# Patient Record
Sex: FEMALE | Race: WHITE | Employment: OTHER | URBAN - METROPOLITAN AREA
[De-identification: names, ages, dates, MRNs, and addresses within clinical notes are randomized per-mention and may not be internally consistent; named-entity substitution may affect disease eponyms.]

---

## 2017-01-07 ENCOUNTER — OFFICE VISIT (OUTPATIENT)
Dept: DERMATOLOGY CLINIC | Facility: CLINIC | Age: 62
End: 2017-01-07

## 2017-01-07 DIAGNOSIS — L29.9 PRURITIC DISORDER: ICD-10-CM

## 2017-01-07 DIAGNOSIS — L81.4 SOLAR LENTIGO: Primary | ICD-10-CM

## 2017-01-07 PROCEDURE — 99212 OFFICE O/P EST SF 10 MIN: CPT | Performed by: DERMATOLOGY

## 2017-01-07 RX ORDER — UBIDECARENONE/VIT E ACET 100MG-5
CAPSULE ORAL
COMMUNITY
End: 2021-10-05

## 2017-01-07 NOTE — PROGRESS NOTES
HPI:     Chief Complaint     Lesion        HPI     Lesion    Additional comments: LOV 3/19/16. Patient with itching patch to right shoulder/back that started 2 months ago. Raised area in October but now unknown if spot is there. Continues to be pruritic.  [facility] Family History   Problem Relation Age of Onset   • Colon Cancer Mother    • Dementia Mother    • Colon Cancer Maternal Grandmother    • Dementia Father        PHYSICAL EXAM:   Patient is alert and oriented and appears their stated age.   They are well-n

## 2017-04-25 PROBLEM — B35.3 TINEA PEDIS OF RIGHT FOOT: Status: ACTIVE | Noted: 2017-04-25

## 2017-06-08 ENCOUNTER — OFFICE VISIT (OUTPATIENT)
Dept: FAMILY MEDICINE CLINIC | Facility: CLINIC | Age: 62
End: 2017-06-08

## 2017-06-08 VITALS
DIASTOLIC BLOOD PRESSURE: 73 MMHG | TEMPERATURE: 98 F | SYSTOLIC BLOOD PRESSURE: 133 MMHG | BODY MASS INDEX: 21 KG/M2 | HEART RATE: 86 BPM | WEIGHT: 117 LBS

## 2017-06-08 DIAGNOSIS — L81.9 IRREGULAR PIGMENTATION OF SKIN: Primary | ICD-10-CM

## 2017-06-08 PROCEDURE — 99213 OFFICE O/P EST LOW 20 MIN: CPT | Performed by: FAMILY MEDICINE

## 2017-06-08 PROCEDURE — 99212 OFFICE O/P EST SF 10 MIN: CPT | Performed by: FAMILY MEDICINE

## 2017-06-08 NOTE — PROGRESS NOTES
HPI:    Patient ID: Heidi Velazquez is a 60year old female. HPI  Patient presents with:  Spot: dark spot on left arm    Review of Systems   Constitutional: Negative.     Skin:        Right arm skin mole change            Current Outpatient Prescriptions:

## 2017-08-24 ENCOUNTER — OFFICE VISIT (OUTPATIENT)
Dept: FAMILY MEDICINE CLINIC | Facility: CLINIC | Age: 62
End: 2017-08-24

## 2017-08-24 VITALS
HEART RATE: 89 BPM | HEIGHT: 62 IN | SYSTOLIC BLOOD PRESSURE: 121 MMHG | WEIGHT: 115.63 LBS | BODY MASS INDEX: 21.28 KG/M2 | RESPIRATION RATE: 12 BRPM | DIASTOLIC BLOOD PRESSURE: 77 MMHG | TEMPERATURE: 99 F

## 2017-08-24 DIAGNOSIS — I65.23 ATHEROSCLEROSIS OF BOTH CAROTID ARTERIES: ICD-10-CM

## 2017-08-24 DIAGNOSIS — Z00.00 ADULT GENERAL MEDICAL EXAM: Primary | ICD-10-CM

## 2017-08-24 DIAGNOSIS — Z23 NEED FOR VACCINATION: ICD-10-CM

## 2017-08-24 PROCEDURE — 90471 IMMUNIZATION ADMIN: CPT | Performed by: FAMILY MEDICINE

## 2017-08-24 PROCEDURE — 90736 HZV VACCINE LIVE SUBQ: CPT | Performed by: FAMILY MEDICINE

## 2017-08-24 PROCEDURE — 99396 PREV VISIT EST AGE 40-64: CPT | Performed by: FAMILY MEDICINE

## 2017-08-24 PROCEDURE — 90472 IMMUNIZATION ADMIN EACH ADD: CPT | Performed by: FAMILY MEDICINE

## 2017-08-24 PROCEDURE — 90715 TDAP VACCINE 7 YRS/> IM: CPT | Performed by: FAMILY MEDICINE

## 2017-08-24 NOTE — PROGRESS NOTES
Patient ID: Harmony Mena is a 60year old female. HPI  Patient presents with:  Routine Physical    She had a life screening on March 10, 2014 and results indicated mild to moderate carotid artery blockage.   They felt that she would benefit from 5 mor Date   GLU 92 03/19/2016   BUN 12 03/19/2016   BUNCREA 18.8 03/19/2016   CREATSERUM 0.64 03/19/2016   ANIONGAP 10 03/19/2016   GFRNAA >60 03/19/2016   GFRAA >60 03/19/2016   CA 9.0 03/19/2016   OSMOCALC 289 03/19/2016   AST 43 (H) 03/19/2016   ALT 28 03/19 IRONTOT, TIBC, IRONSAT, TRANSFERRIN, TIBCP, IRONBIND, SAT, SATUR    No results found for: VINEET      Lab Results  Component Value Date   FZZU89VC 28.2 06/09/2012     No results found for: PSA, QPSA, TOTPSASCREEN      Wt Readings from Last 6 Encounters:  08/2 Smokeless tobacco: Never Used    Alcohol use Yes  0.0 oz/week     Comment: wine, 1 glass daily    Drug use: No    Sexual activity: Not on file     Other Topics Concern    Caffeine Concern No    Pt has a pacemaker No    Reaction to local anesthetic No Diagnoses and all orders for this visit:    Adult general medical exam  -     CBC WITH DIFFERENTIAL WITH PLATELET; Future  -     COMP METABOLIC PANEL (14); Future  -     LIPID PANEL;  Future  -     ASSAY, THYROID STIM HORMONE; Future    Need for vaccina

## 2017-08-26 ENCOUNTER — LAB ENCOUNTER (OUTPATIENT)
Dept: LAB | Age: 62
End: 2017-08-26
Attending: FAMILY MEDICINE
Payer: COMMERCIAL

## 2017-08-26 DIAGNOSIS — Z00.00 ADULT GENERAL MEDICAL EXAM: ICD-10-CM

## 2017-08-26 DIAGNOSIS — I65.23 ATHEROSCLEROSIS OF BOTH CAROTID ARTERIES: ICD-10-CM

## 2017-08-26 LAB
ALBUMIN SERPL BCP-MCNC: 3.9 G/DL (ref 3.5–4.8)
ALBUMIN/GLOB SERPL: 1.3 {RATIO} (ref 1–2)
ALP SERPL-CCNC: 78 U/L (ref 32–100)
ALT SERPL-CCNC: 28 U/L (ref 14–54)
ANION GAP SERPL CALC-SCNC: 10 MMOL/L (ref 0–18)
AST SERPL-CCNC: 36 U/L (ref 15–41)
BASOPHILS # BLD: 0 K/UL (ref 0–0.2)
BASOPHILS NFR BLD: 1 %
BILIRUB SERPL-MCNC: 1.6 MG/DL (ref 0.3–1.2)
BUN SERPL-MCNC: 10 MG/DL (ref 8–20)
BUN/CREAT SERPL: 9.3 (ref 10–20)
CALCIUM SERPL-MCNC: 9.2 MG/DL (ref 8.5–10.5)
CHLORIDE SERPL-SCNC: 102 MMOL/L (ref 95–110)
CHOLEST SERPL-MCNC: 243 MG/DL (ref 110–200)
CO2 SERPL-SCNC: 26 MMOL/L (ref 22–32)
CREAT SERPL-MCNC: 1.07 MG/DL (ref 0.5–1.5)
CRP SERPL HS-MCNC: 3 MG/L (ref 0–7.5)
EOSINOPHIL # BLD: 0.1 K/UL (ref 0–0.7)
EOSINOPHIL NFR BLD: 3 %
ERYTHROCYTE [DISTWIDTH] IN BLOOD BY AUTOMATED COUNT: 13 % (ref 11–15)
GLOBULIN PLAS-MCNC: 2.9 G/DL (ref 2.5–3.7)
GLUCOSE SERPL-MCNC: 93 MG/DL (ref 70–99)
HCT VFR BLD AUTO: 38.7 % (ref 35–48)
HDLC SERPL-MCNC: 87 MG/DL
HGB BLD-MCNC: 13.5 G/DL (ref 12–16)
LDLC SERPL CALC-MCNC: 145 MG/DL (ref 0–99)
LYMPHOCYTES # BLD: 0.9 K/UL (ref 1–4)
LYMPHOCYTES NFR BLD: 19 %
MCH RBC QN AUTO: 33.7 PG (ref 27–32)
MCHC RBC AUTO-ENTMCNC: 34.9 G/DL (ref 32–37)
MCV RBC AUTO: 96.6 FL (ref 80–100)
MONOCYTES # BLD: 0.4 K/UL (ref 0–1)
MONOCYTES NFR BLD: 9 %
NEUTROPHILS # BLD AUTO: 3.4 K/UL (ref 1.8–7.7)
NEUTROPHILS NFR BLD: 69 %
NONHDLC SERPL-MCNC: 156 MG/DL
OSMOLALITY UR CALC.SUM OF ELEC: 285 MOSM/KG (ref 275–295)
PLATELET # BLD AUTO: 233 K/UL (ref 140–400)
PMV BLD AUTO: 7.2 FL (ref 7.4–10.3)
POTASSIUM SERPL-SCNC: 4.3 MMOL/L (ref 3.3–5.1)
PROT SERPL-MCNC: 6.8 G/DL (ref 5.9–8.4)
RBC # BLD AUTO: 4 M/UL (ref 3.7–5.4)
SODIUM SERPL-SCNC: 138 MMOL/L (ref 136–144)
TRIGL SERPL-MCNC: 56 MG/DL (ref 1–149)
TSH SERPL-ACNC: 1.67 UIU/ML (ref 0.45–5.33)
WBC # BLD AUTO: 4.9 K/UL (ref 4–11)

## 2017-08-26 PROCEDURE — 85025 COMPLETE CBC W/AUTO DIFF WBC: CPT

## 2017-08-26 PROCEDURE — 84443 ASSAY THYROID STIM HORMONE: CPT

## 2017-08-26 PROCEDURE — 80061 LIPID PANEL: CPT

## 2017-08-26 PROCEDURE — 86141 C-REACTIVE PROTEIN HS: CPT

## 2017-08-26 PROCEDURE — 36415 COLL VENOUS BLD VENIPUNCTURE: CPT

## 2017-08-26 PROCEDURE — 80053 COMPREHEN METABOLIC PANEL: CPT

## 2017-08-31 ENCOUNTER — TELEPHONE (OUTPATIENT)
Dept: OTHER | Age: 62
End: 2017-08-31

## 2017-08-31 RX ORDER — ATORVASTATIN CALCIUM 20 MG/1
20 TABLET, FILM COATED ORAL NIGHTLY
Qty: 30 TABLET | Refills: 3 | Status: SHIPPED | OUTPATIENT
Start: 2017-08-31 | End: 2017-09-23

## 2017-08-31 RX ORDER — ATORVASTATIN CALCIUM 20 MG/1
20 TABLET, FILM COATED ORAL NIGHTLY
Qty: 30 TABLET | Refills: 2 | Status: CANCELLED | OUTPATIENT
Start: 2017-08-31

## 2017-08-31 NOTE — TELEPHONE ENCOUNTER
Lets go ahead and start her on Lipitor 20 mg and then 2 months go ahead and order an AST, ALT and a lipid panel for her with diagnosis of hyperlipidemia and see how she does.   Have her take it with or without food either at dinner or bedtime as it works be

## 2017-08-31 NOTE — TELEPHONE ENCOUNTER
Advised patient of Dr. Erma Valle note. Patient verbalized understanding and stated that she was doing a diet before that had her eating red meat for 2 years. Patient has cut out the redmeat and has been exercising.  Patient would prefer to still stick with d

## 2017-08-31 NOTE — TELEPHONE ENCOUNTER
Notes Recorded by Ismael Blevins DO on 8/29/2017 at 3:49 PM CDT  Your thyroid test is normal.  Your C-reactive protein for inflammation of the heart is at 3 and the #3 or above indicates elevated coronary artery disease risk due to high cholesterol. Alex Childs

## 2017-09-23 ENCOUNTER — OFFICE VISIT (OUTPATIENT)
Dept: FAMILY MEDICINE CLINIC | Facility: CLINIC | Age: 62
End: 2017-09-23

## 2017-09-23 VITALS
SYSTOLIC BLOOD PRESSURE: 110 MMHG | BODY MASS INDEX: 21.38 KG/M2 | WEIGHT: 116.19 LBS | HEART RATE: 85 BPM | DIASTOLIC BLOOD PRESSURE: 69 MMHG | RESPIRATION RATE: 12 BRPM | TEMPERATURE: 98 F | HEIGHT: 62 IN

## 2017-09-23 DIAGNOSIS — I65.23 ATHEROSCLEROSIS OF BOTH CAROTID ARTERIES: ICD-10-CM

## 2017-09-23 DIAGNOSIS — E78.2 MIXED HYPERLIPIDEMIA: Primary | ICD-10-CM

## 2017-09-23 PROCEDURE — 99212 OFFICE O/P EST SF 10 MIN: CPT | Performed by: FAMILY MEDICINE

## 2017-09-23 PROCEDURE — 99214 OFFICE O/P EST MOD 30 MIN: CPT | Performed by: FAMILY MEDICINE

## 2017-09-23 NOTE — PROGRESS NOTES
Patient ID: Ian Christianson is a 60year old female. HPI  Patient presents with:  Test Results: labs done on 8-26-17       She is here with her . She does not eat too much meat. She has to exercise classes today.   She did have some mild atheros HDL Cholesterol      mg/dL 87   CHOLESTEROL, TOTAL      110 - 200 mg/dL 243 (H)   Triglycerides      1 - 149 mg/dL 56   NON HDL CHOL      <130 mg/dL 156 (H)   LDL Cholesterol Calc      0 - 99 mg/dL 145 (H)   TSH      0.45 - 5.33 uIU/mL 1.67   HSCRP person, place, and time. Skin: Skin is warm and dry. Psychiatric: Patient has a normal mood and affect. Nursing note and vitals reviewed.          ASSESSMENT/PLAN:     Diagnoses and all orders for this visit:    Mixed hyperlipidemia  I put her numbers

## 2017-09-23 NOTE — PATIENT INSTRUCTIONS
Call for a coronary calcium score at the hospital.  Call [phone] to schedule this. It is either $75 or $100 cash. You should get the results the same day and please asked them to send me the results.

## 2017-10-21 ENCOUNTER — HOSPITAL ENCOUNTER (OUTPATIENT)
Dept: CT IMAGING | Facility: HOSPITAL | Age: 62
Discharge: HOME | End: 2017-10-21
Attending: FAMILY MEDICINE

## 2017-10-21 VITALS — DIASTOLIC BLOOD PRESSURE: 76 MMHG | SYSTOLIC BLOOD PRESSURE: 146 MMHG | HEART RATE: 76 BPM

## 2017-10-21 DIAGNOSIS — Z13.9 ENCOUNTER FOR SCREENING, UNSPECIFIED: ICD-10-CM

## 2017-10-25 ENCOUNTER — TELEPHONE (OUTPATIENT)
Dept: OTHER | Age: 62
End: 2017-10-25

## 2017-10-25 NOTE — TELEPHONE ENCOUNTER
LMTCB. Transfer to [id number].      ----- Message from Jerica Phillips DO sent at 10/25/2017  1:48 PM CDT -----  Your coronary calcium score was wonderful you have a score of 0 which puts you at a very low risk of significant coronary artery narrowing.   I would

## 2018-08-06 ENCOUNTER — NURSE TRIAGE (OUTPATIENT)
Dept: OTHER | Age: 63
End: 2018-08-06

## 2018-08-06 NOTE — TELEPHONE ENCOUNTER
Patient called and informed. An appointment scheduled for 8/7/18. Patient stated she has not felt the pains since speaking to me this morning.

## 2018-08-06 NOTE — TELEPHONE ENCOUNTER
I called the patient and she stated she feels better now and would like to come in tomorrow if possible. Can we add her on tomorrow?

## 2018-08-06 NOTE — TELEPHONE ENCOUNTER
Action Requested: Summary for Provider     []  Critical Lab, Recommendations Needed  [] Need Additional Advice  []   FYI    []   Need Orders  [] Need Medications Sent to Pharmacy  []  Other     SUMMARY:   Please advise if we can add her on to the schedule.

## 2018-08-07 ENCOUNTER — APPOINTMENT (OUTPATIENT)
Dept: LAB | Age: 63
End: 2018-08-07
Attending: FAMILY MEDICINE
Payer: COMMERCIAL

## 2018-08-07 ENCOUNTER — OFFICE VISIT (OUTPATIENT)
Dept: FAMILY MEDICINE CLINIC | Facility: CLINIC | Age: 63
End: 2018-08-07
Payer: COMMERCIAL

## 2018-08-07 VITALS
BODY MASS INDEX: 21.57 KG/M2 | RESPIRATION RATE: 14 BRPM | SYSTOLIC BLOOD PRESSURE: 139 MMHG | TEMPERATURE: 98 F | HEART RATE: 96 BPM | HEIGHT: 62 IN | DIASTOLIC BLOOD PRESSURE: 85 MMHG | WEIGHT: 117.19 LBS

## 2018-08-07 DIAGNOSIS — R07.89 LEFT-SIDED CHEST WALL PAIN: Primary | ICD-10-CM

## 2018-08-07 DIAGNOSIS — R07.89 LEFT-SIDED CHEST WALL PAIN: ICD-10-CM

## 2018-08-07 DIAGNOSIS — K14.6 BURNING TONGUE SYNDROME: ICD-10-CM

## 2018-08-07 DIAGNOSIS — R94.31 ABNORMAL EKG: ICD-10-CM

## 2018-08-07 DIAGNOSIS — F41.9 ANXIETY: ICD-10-CM

## 2018-08-07 LAB
CRP SERPL-MCNC: 0.5 MG/DL (ref 0–0.9)
ERYTHROCYTE [SEDIMENTATION RATE] IN BLOOD: 8 MM/HR (ref 0–30)
RHEUMATOID FACT SER QL: <5 IU/ML
VIT B12 SERPL-MCNC: 431 PG/ML (ref 181–914)

## 2018-08-07 PROCEDURE — 93010 ELECTROCARDIOGRAM REPORT: CPT | Performed by: FAMILY MEDICINE

## 2018-08-07 PROCEDURE — 99214 OFFICE O/P EST MOD 30 MIN: CPT | Performed by: FAMILY MEDICINE

## 2018-08-07 PROCEDURE — 93005 ELECTROCARDIOGRAM TRACING: CPT

## 2018-08-07 PROCEDURE — 86038 ANTINUCLEAR ANTIBODIES: CPT

## 2018-08-07 PROCEDURE — 85652 RBC SED RATE AUTOMATED: CPT

## 2018-08-07 PROCEDURE — 82607 VITAMIN B-12: CPT

## 2018-08-07 PROCEDURE — 86140 C-REACTIVE PROTEIN: CPT

## 2018-08-07 PROCEDURE — 99212 OFFICE O/P EST SF 10 MIN: CPT | Performed by: FAMILY MEDICINE

## 2018-08-07 PROCEDURE — 83013 H PYLORI (C-13) BREATH: CPT

## 2018-08-07 PROCEDURE — 36415 COLL VENOUS BLD VENIPUNCTURE: CPT

## 2018-08-07 PROCEDURE — 86431 RHEUMATOID FACTOR QUANT: CPT

## 2018-08-07 NOTE — PROGRESS NOTES
Patient ID: Heidi Velazquez is a 58year old female. HPI  Patient presents with:   Anxiety: chest discomfort     Action Requested: Summary for Provider     []  Critical Lab, Recommendations Needed  [] Need Additional Advice  []   FYI    []   Need Orders see a counselor or psychiatrist.  She states that the pain on the left side is fleeting in nature and sometimes a pinching pain. It does not really happen when she is exercising. She had a coronary calcium score in October 2017 with a score of 0.   She well-nourished. No distress. HENT:   Head: Normocephalic and atraumatic. Neck: Normal range of motion. Neck supple. No thyromegaly present. Oropharynx is clear. Tongue has no glossitis, redness, ulcerations.   Lymphadenopathy:     Has  no cervical ad

## 2018-08-08 ENCOUNTER — PATIENT MESSAGE (OUTPATIENT)
Dept: FAMILY MEDICINE CLINIC | Facility: CLINIC | Age: 63
End: 2018-08-08

## 2018-08-08 NOTE — TELEPHONE ENCOUNTER
From: Chela Herrera  To: Francesca Dior DO  Sent: 8/8/2018 4:49 AM CDT  Subject: Test Results Question    I would like to schedule the stress test for this Saturday, can you set that up?     Jose Briceno

## 2018-08-09 ENCOUNTER — TELEPHONE (OUTPATIENT)
Dept: FAMILY MEDICINE CLINIC | Facility: CLINIC | Age: 63
End: 2018-08-09

## 2018-08-09 LAB
H. PYLORI BREATH TEST: NEGATIVE
NUCLEAR IGG TITR SER IF: NEGATIVE {TITER}

## 2018-08-09 NOTE — TELEPHONE ENCOUNTER
Patient was left a message to call back. Transfer to [id number]  RN, please just make her f/u appt with Dr. Tracy Riddle on Tuesday SDS slot or RN slot. It appears she viewed her results via ComCam. I see a ComCam email sent to patient.  I do see Stress test sched

## 2018-08-09 NOTE — TELEPHONE ENCOUNTER
Per pt she states she got her lab results from 8/7 but is going in on Monday to have another test. She called upset because she's demanding an apt on 8/14 before she leaves on vacation. As of now only SDS slots are available.   She is also upset because she

## 2018-08-09 NOTE — TELEPHONE ENCOUNTER
----- Message from Tamara Perry DO sent at 8/7/2018  7:12 PM CDT -----  Your EKG shows some mild abnormalities. It states that you had a heart attack that is old but many times these are false findings.   We need to do a stress test.  I will go ahead and

## 2018-08-10 NOTE — TELEPHONE ENCOUNTER
Pt returned the call. She speaks rapidly and sounds anxious. She would like to discuss results of ekg and labs. Saw the results on MyChart but does not understand them. Pt was given reassurance, advised that stress test is the f/u to the abnormal EKG.  She

## 2018-08-10 NOTE — TELEPHONE ENCOUNTER
No need to see me. Let us just get the stress test and go from there. Thank you for reassuring her and calming her down.

## 2018-08-13 ENCOUNTER — HOSPITAL ENCOUNTER (OUTPATIENT)
Dept: CV DIAGNOSTICS | Facility: HOSPITAL | Age: 63
Discharge: HOME | End: 2018-08-13
Attending: FAMILY MEDICINE
Payer: COMMERCIAL

## 2018-08-13 DIAGNOSIS — R07.89 LEFT-SIDED CHEST WALL PAIN: ICD-10-CM

## 2018-08-13 DIAGNOSIS — R94.31 ABNORMAL EKG: ICD-10-CM

## 2018-08-13 DIAGNOSIS — F41.9 ANXIETY: ICD-10-CM

## 2018-08-13 PROCEDURE — 93018 CV STRESS TEST I&R ONLY: CPT | Performed by: FAMILY MEDICINE

## 2018-08-13 PROCEDURE — 93016 CV STRESS TEST SUPVJ ONLY: CPT | Performed by: FAMILY MEDICINE

## 2018-08-13 PROCEDURE — 93017 CV STRESS TEST TRACING ONLY: CPT | Performed by: FAMILY MEDICINE

## 2018-11-18 ENCOUNTER — IMAGING SERVICES (OUTPATIENT)
Dept: OTHER | Age: 63
End: 2018-11-18

## 2018-11-18 ENCOUNTER — HOSPITAL (OUTPATIENT)
Dept: OTHER | Age: 63
End: 2018-11-18
Attending: EMERGENCY MEDICINE

## 2018-12-07 ENCOUNTER — HOSPITAL ENCOUNTER (OUTPATIENT)
Dept: GENERAL RADIOLOGY | Age: 63
Discharge: HOME | End: 2018-12-07
Attending: FAMILY MEDICINE
Payer: COMMERCIAL

## 2018-12-07 ENCOUNTER — OFFICE VISIT (OUTPATIENT)
Dept: FAMILY MEDICINE CLINIC | Facility: CLINIC | Age: 63
End: 2018-12-07
Payer: COMMERCIAL

## 2018-12-07 ENCOUNTER — NURSE TRIAGE (OUTPATIENT)
Dept: OTHER | Age: 63
End: 2018-12-07

## 2018-12-07 VITALS
BODY MASS INDEX: 21.38 KG/M2 | WEIGHT: 116.19 LBS | SYSTOLIC BLOOD PRESSURE: 153 MMHG | HEART RATE: 109 BPM | RESPIRATION RATE: 16 BRPM | HEIGHT: 62 IN | TEMPERATURE: 99 F | DIASTOLIC BLOOD PRESSURE: 84 MMHG

## 2018-12-07 DIAGNOSIS — S62.502D CLOSED NONDISPLACED FRACTURE OF PHALANX OF LEFT THUMB WITH ROUTINE HEALING, UNSPECIFIED PHALANX, SUBSEQUENT ENCOUNTER: ICD-10-CM

## 2018-12-07 DIAGNOSIS — S02.2XXA CLOSED FRACTURE OF NASAL BONE, INITIAL ENCOUNTER: ICD-10-CM

## 2018-12-07 DIAGNOSIS — S00.83XA TRAUMATIC ECCHYMOSIS OF FACE, INITIAL ENCOUNTER: ICD-10-CM

## 2018-12-07 DIAGNOSIS — S00.83XA CONTUSION OF FACE, INITIAL ENCOUNTER: ICD-10-CM

## 2018-12-07 DIAGNOSIS — S00.83XA CONTUSION OF FACE, INITIAL ENCOUNTER: Primary | ICD-10-CM

## 2018-12-07 DIAGNOSIS — J34.89 NOSE PAIN: ICD-10-CM

## 2018-12-07 DIAGNOSIS — R55 SYNCOPE, UNSPECIFIED SYNCOPE TYPE: ICD-10-CM

## 2018-12-07 PROCEDURE — 70160 X-RAY EXAM OF NASAL BONES: CPT | Performed by: FAMILY MEDICINE

## 2018-12-07 PROCEDURE — 99212 OFFICE O/P EST SF 10 MIN: CPT | Performed by: FAMILY MEDICINE

## 2018-12-07 PROCEDURE — 99215 OFFICE O/P EST HI 40 MIN: CPT | Performed by: FAMILY MEDICINE

## 2018-12-07 NOTE — PROGRESS NOTES
Patient ID: Clayton Gonzales is a 58year old female.     HPI  Patient presents with:  Fall (musculoskeletal, neurologic)    Joselyn Hidalgo RN   Registered Nurse      Telephone Encounter   Signed   Encounter Date:  12/7/2018               Signed She fractured her left thumb and left index finger and this happened about 3 weeks ago. She is in a splint at this time and sees [facility]. She has an appointment to see them after she sees me today.   She is been only be able to use her right 04/25/17 : 21.58 kg/m²      BP Readings from Last 6 Encounters:  12/07/18 : 153/84  08/07/18 : 139/85  10/21/17 : 146/76  09/23/17 : 110/69  08/24/17 : 121/77  06/08/17 : 133/73        Review of Systems   Constitutional: Negative for diaphoresis.    HENT: P Eyes: Conjunctivae and EOM are normal. Pupils are equal, round, and reactive to light. She does have bruising underneath both eyes at the maxilla but otherwise really not tender there. There is no step-off over the zygomatic arch.   The orbits do not hav I did do a referral to neurology. After we are done I went and walked over to the nurses station to complete my notes.   After she was discharged from the office from a nurse she actually instead walked all the way down to me as she had couple more questio

## 2018-12-07 NOTE — TELEPHONE ENCOUNTER
Action Requested: Summary for Provider     []  Critical Lab, Recommendations Needed  [] Need Additional Advice  []   FYI    []   Need Orders  [] Need Medications Sent to Pharmacy  []  Other     SUMMARY: appt scheduled to see PCP today.   Onset 12 hours ago

## 2018-12-11 ENCOUNTER — OFFICE VISIT (OUTPATIENT)
Dept: OTOLARYNGOLOGY | Facility: CLINIC | Age: 63
End: 2018-12-11
Payer: COMMERCIAL

## 2018-12-11 VITALS
DIASTOLIC BLOOD PRESSURE: 75 MMHG | WEIGHT: 116.19 LBS | SYSTOLIC BLOOD PRESSURE: 151 MMHG | TEMPERATURE: 98 F | BODY MASS INDEX: 21.38 KG/M2 | HEIGHT: 62 IN

## 2018-12-11 DIAGNOSIS — S02.2XXA CLOSED FRACTURE OF NASAL BONE, INITIAL ENCOUNTER: Primary | ICD-10-CM

## 2018-12-11 PROCEDURE — 99243 OFF/OP CNSLTJ NEW/EST LOW 30: CPT | Performed by: OTOLARYNGOLOGY

## 2018-12-11 PROCEDURE — 99212 OFFICE O/P EST SF 10 MIN: CPT | Performed by: OTOLARYNGOLOGY

## 2018-12-12 NOTE — PROGRESS NOTES
Harmony Mena is a 58year old female. Patient presents with:  Nose Problem: nasal injury happened on december 6,2018    HPI:   She fell and struck her nose 5 days ago. She had significant bruising which started the next day.   She is not really having mu - Normal. Cranial nerves - Cranial nerves II through XII grossly intact.    Neck Exam Normal Inspection - Normal. Palpation - Normal. Parotid gland - Normal. Thyroid gland - Normal.   Psychiatric Normal Orientation - Oriented to time, place, person & situat

## 2019-02-19 ENCOUNTER — OFFICE VISIT (OUTPATIENT)
Dept: OTOLARYNGOLOGY | Facility: CLINIC | Age: 64
End: 2019-02-19
Payer: COMMERCIAL

## 2019-02-19 VITALS
TEMPERATURE: 98 F | WEIGHT: 116.19 LBS | SYSTOLIC BLOOD PRESSURE: 120 MMHG | HEIGHT: 62 IN | BODY MASS INDEX: 21.38 KG/M2 | DIASTOLIC BLOOD PRESSURE: 60 MMHG

## 2019-02-19 DIAGNOSIS — S02.2XXA CLOSED FRACTURE OF NASAL BONE, INITIAL ENCOUNTER: Primary | ICD-10-CM

## 2019-02-19 PROCEDURE — 99213 OFFICE O/P EST LOW 20 MIN: CPT | Performed by: OTOLARYNGOLOGY

## 2019-02-19 PROCEDURE — 99212 OFFICE O/P EST SF 10 MIN: CPT | Performed by: OTOLARYNGOLOGY

## 2019-02-19 NOTE — PROGRESS NOTES
Chela Herrera is a 58year old female. Patient presents with: Follow - Up: 2 month follow up- closed fracture of nasal bone-    HPI:   She feels that there is still a dark area underneath her eyelids mainly on her right side.   She feels that she was able gland - Normal. Thyroid gland - Normal.   Psychiatric Normal Orientation - Oriented to time, place, person & situation. Appropriate mood and affect. Lymph Detail Normal Submental. Submandibular. Anterior cervical. Posterior cervical. Supraclavicular.    E

## 2019-03-07 ENCOUNTER — TELEPHONE (OUTPATIENT)
Dept: NEUROLOGY | Facility: CLINIC | Age: 64
End: 2019-03-07

## 2019-03-07 ENCOUNTER — OFFICE VISIT (OUTPATIENT)
Dept: NEUROLOGY | Facility: CLINIC | Age: 64
End: 2019-03-07
Payer: COMMERCIAL

## 2019-03-07 VITALS
BODY MASS INDEX: 21.62 KG/M2 | SYSTOLIC BLOOD PRESSURE: 116 MMHG | WEIGHT: 116 LBS | HEART RATE: 84 BPM | HEIGHT: 61.5 IN | DIASTOLIC BLOOD PRESSURE: 64 MMHG

## 2019-03-07 DIAGNOSIS — R55 SYNCOPE, UNSPECIFIED SYNCOPE TYPE: Primary | ICD-10-CM

## 2019-03-07 PROCEDURE — 99244 OFF/OP CNSLTJ NEW/EST MOD 40: CPT | Performed by: OTHER

## 2019-03-07 RX ORDER — MULTIVIT-MIN/IRON FUM/FOLIC AC 7.5 MG-4
1 TABLET ORAL DAILY
COMMUNITY
End: 2021-10-05

## 2019-03-07 NOTE — TELEPHONE ENCOUNTER
[employer] for authorization of approval for CT brain wo cpt code 57245. Approval was given with Authorization # [id number] effective 03/07/19 to 04/05/19. Will call Pt. To inform. L/m advising of approval. Can proceed with scheduling appt.

## 2019-03-07 NOTE — PROGRESS NOTES
Ms Alo Hernandez, relating infertility treatment, over 25 years ago, 2 syncopal episodes. 5 years ago had a syncopal episodes on the stairs. Last November had another syncopal episode. Had a recent fall without actual loss of consciousness.   With these episod Past Medical History:   Diagnosis Date   • Leg fracture 2007   • Low blood pressure       History reviewed. No pertinent surgical history.    Family History   Problem Relation Age of Onset   • Colon Cancer Mother    • Dementia Mother    • Colon Cancer Mat commands. Cranial Nerves: Pupils Equal, Round and reactive, Extra Ocular movements intact, face symmetric, tongue midline, V1-V3 intact bilaterally. Cranial Nerves 3-7,9-12 are normal. No nystagmus.   Motor: 5/5 strength in the upper and lower extremities

## 2019-03-16 ENCOUNTER — HOSPITAL ENCOUNTER (OUTPATIENT)
Dept: ELECTROPHYSIOLOGY | Facility: HOSPITAL | Age: 64
Discharge: HOME | End: 2019-03-16
Attending: Other
Payer: COMMERCIAL

## 2019-03-16 ENCOUNTER — HOSPITAL ENCOUNTER (OUTPATIENT)
Dept: CT IMAGING | Facility: HOSPITAL | Age: 64
Discharge: HOME | End: 2019-03-16
Attending: Other
Payer: COMMERCIAL

## 2019-03-16 DIAGNOSIS — R55 SYNCOPE, UNSPECIFIED SYNCOPE TYPE: ICD-10-CM

## 2019-03-16 PROCEDURE — 95816 EEG AWAKE AND DROWSY: CPT | Performed by: OTHER

## 2019-03-16 PROCEDURE — 70450 CT HEAD/BRAIN W/O DYE: CPT | Performed by: OTHER

## 2019-03-19 ENCOUNTER — TELEPHONE (OUTPATIENT)
Dept: NEUROLOGY | Facility: CLINIC | Age: 64
End: 2019-03-19

## 2019-03-19 NOTE — TELEPHONE ENCOUNTER
Please call the patient and tell her that the CT scan of the head is essentially normal.  Please tell her that the EEG for some reason has not been read and that I have left a message for the EEG technician to get a good.   Please have her call the office i

## 2019-03-19 NOTE — TELEPHONE ENCOUNTER
Left detailed message on VM(hippa verified) of CT results. Also, still waiting for EEG results and we will follow up with results but if she does not hear from us in a few days, to call the office.

## 2019-03-20 NOTE — PROCEDURES
EEG report    REFERRING PHYSICIAN: No att. providers found    PCP and phone number:  Bc Aguilar DO  [phone]    TECHNIQUE: 21 channels of EEG, 2 channels of EOG, and 1 channel of EKG were recorded utilizing the International 10/20 System.  The re

## 2019-03-22 NOTE — TELEPHONE ENCOUNTER
Spoke to patient and advised EEG normal per report. Also gave Ct Scan results per Dr Lisa Morales note 3/19/19. Advised would notify her of any further testing if Dr Maddy Ayala feels is necessary. LOV 3/7/19 discuss possible Holter or cardiology consult.    MCKENZIE mann

## 2019-04-02 NOTE — TELEPHONE ENCOUNTER
Please tell the patient that from a neurology standpoint there is no further testing warranted. Please have the patient follow-up with her primary care doctor to obtain cardiology consult, possible Holter monitor.   Thank

## 2019-04-02 NOTE — TELEPHONE ENCOUNTER
Left detailed message on vm (hipaa verified) of 's remarks. Also, to call the office with questions if any.

## 2019-12-16 ENCOUNTER — OFFICE VISIT (OUTPATIENT)
Dept: FAMILY MEDICINE CLINIC | Facility: CLINIC | Age: 64
End: 2019-12-16
Payer: MEDICARE

## 2019-12-16 ENCOUNTER — HOSPITAL ENCOUNTER (OUTPATIENT)
Dept: GENERAL RADIOLOGY | Age: 64
Discharge: HOME | End: 2019-12-16
Attending: FAMILY MEDICINE | Admitting: FAMILY MEDICINE
Payer: MEDICARE

## 2019-12-16 VITALS
HEART RATE: 80 BPM | SYSTOLIC BLOOD PRESSURE: 130 MMHG | HEIGHT: 61.5 IN | DIASTOLIC BLOOD PRESSURE: 62 MMHG | BODY MASS INDEX: 22.18 KG/M2 | WEIGHT: 119 LBS

## 2019-12-16 DIAGNOSIS — Z23 NEED FOR VACCINATION: ICD-10-CM

## 2019-12-16 DIAGNOSIS — M25.831 MASS OF JOINT OF RIGHT WRIST: Primary | ICD-10-CM

## 2019-12-16 DIAGNOSIS — M25.831 MASS OF JOINT OF RIGHT WRIST: ICD-10-CM

## 2019-12-16 PROCEDURE — 73110 X-RAY EXAM OF WRIST: CPT | Performed by: FAMILY MEDICINE

## 2019-12-16 PROCEDURE — 90750 HZV VACC RECOMBINANT IM: CPT | Performed by: FAMILY MEDICINE

## 2019-12-16 PROCEDURE — 90471 IMMUNIZATION ADMIN: CPT | Performed by: FAMILY MEDICINE

## 2019-12-16 PROCEDURE — G0463 HOSPITAL OUTPT CLINIC VISIT: HCPCS | Performed by: FAMILY MEDICINE

## 2019-12-16 PROCEDURE — 99213 OFFICE O/P EST LOW 20 MIN: CPT | Performed by: FAMILY MEDICINE

## 2019-12-16 NOTE — PATIENT INSTRUCTIONS
Return to clinic after February 16, 2020 for the second Shingrix injection with a nurse visit. No need to see me.   Why do not you make it a physical exam and that way we can take care of the injection at the same time we do a physical exam.  May take thaddeus

## 2019-12-16 NOTE — PROGRESS NOTES
Patient ID: Tylor Sutherland is a 71year old female. HPI  Patient presents with: Other: Bump on right wrist     The patient complains of a mass on the inside of her right wrist starting approximately 1 year ago.  This has increased in size to the point Fatty Acids (FISH OIL OR) Take by mouth. • Probiotic Product (PROBIOTIC DAILY OR) Take by mouth.        Allergies:  Cat Hair Extract          Dander                      Comment:Dog  Scallops                   PHYSICAL EXAM:   Physical Exam   [facility] Referral Type:OFFICE VISIT          Referred to Shiraz Solorzano MD          Requested Specialty:SURGERY, PLASTIC          Number of Visits Requested:3        Follow up if symptoms persist.  Take medicine (if given) as prescribed.   Appr

## 2020-01-02 ENCOUNTER — OFFICE VISIT (OUTPATIENT)
Dept: SURGERY | Facility: CLINIC | Age: 65
End: 2020-01-02
Payer: MEDICARE

## 2020-01-02 DIAGNOSIS — M67.432 GANGLION OF LEFT WRIST: Primary | ICD-10-CM

## 2020-01-02 PROCEDURE — 99203 OFFICE O/P NEW LOW 30 MIN: CPT | Performed by: PLASTIC SURGERY

## 2020-01-02 PROCEDURE — G0463 HOSPITAL OUTPT CLINIC VISIT: HCPCS | Performed by: PLASTIC SURGERY

## 2020-01-02 NOTE — H&P
Yareli Roe is a 71year old female that presents with Patient presents with:  Ganglion: R wrist ulnar  .     REFERRED BY:   Shwetha Simpson DO    Pacemaker: No  Latex Allergy: no  Coumadin: No  Plavix: No  Other anticoagulants: No  Cardiac stents: No       Spouse name: Not on file      Number of children: Not on file      Years of education: Not on file      Highest education level: Not on file    Occupational History      Not on file    Social Needs      Financial resource strain: Not on file No        Pt has a defibrillator: Not Asked        Breast feeding: Not Asked        Reaction to local anesthetic: No        Left Handed: Yes        Right Handed: No        Currently spends a great deal of time in the sun: No        Past Sunlamp Treatments

## 2020-01-14 ENCOUNTER — OFFICE VISIT (OUTPATIENT)
Dept: NEUROLOGY | Facility: CLINIC | Age: 65
End: 2020-01-14
Payer: MEDICARE

## 2020-01-14 VITALS
BODY MASS INDEX: 21.62 KG/M2 | HEART RATE: 80 BPM | WEIGHT: 116 LBS | HEIGHT: 61.5 IN | RESPIRATION RATE: 18 BRPM | DIASTOLIC BLOOD PRESSURE: 62 MMHG | SYSTOLIC BLOOD PRESSURE: 110 MMHG

## 2020-01-14 DIAGNOSIS — R29.3 POOR POSTURE: ICD-10-CM

## 2020-01-14 DIAGNOSIS — M67.431 GANGLION CYST OF BOTH WRISTS: Primary | ICD-10-CM

## 2020-01-14 DIAGNOSIS — M67.432 GANGLION CYST OF BOTH WRISTS: Primary | ICD-10-CM

## 2020-01-14 DIAGNOSIS — M25.531 RIGHT WRIST PAIN: ICD-10-CM

## 2020-01-14 PROCEDURE — 99204 OFFICE O/P NEW MOD 45 MIN: CPT | Performed by: PHYSICAL MEDICINE & REHABILITATION

## 2020-01-14 NOTE — PROGRESS NOTES
[address] PATIENT EVALUATION    Consultation as a request of Dr. Olive Mancera    Chief Complaint: Right wrist pain    HISTORY OF PRESENT ILLNESS:   Patient presents with:  Wrist Pain: new left handed pat • Multiple Vitamins-Minerals (MULTI-VITAMIN/MINERALS) Oral Tab Take 1 tablet by mouth daily. • Resveratrol 100 MG Oral Cap Take by mouth. • Omega-3 Fatty Acids (FISH OIL OR) Take by mouth.      • Probiotic Product (PROBIOTIC DAILY OR) Take by mout (52.6 kg)   BMI 21.56 kg/m²   General: No immediate distress  Head: Normocephalic/ Atraumatic  Eyes: Extra-occular movements intact. Ears: No auricular hematoma or deformities  Mouth: No lesions or ulcerations  Heart: peripheral pulses intact.  Normal cap 03/19/2016    BILT 1.6 (H) 08/26/2017    TP 6.8 08/26/2017    ALB 3.9 08/26/2017    GLOBULIN 2.9 08/26/2017    AGRATIO 1.4 03/19/2016     08/26/2017    K 4.3 08/26/2017     08/26/2017    CO2 26 08/26/2017     No results found for: PTP, PT, INR

## 2020-02-17 ENCOUNTER — APPOINTMENT (OUTPATIENT)
Dept: LAB | Age: 65
End: 2020-02-17
Attending: FAMILY MEDICINE
Payer: MEDICARE

## 2020-02-17 ENCOUNTER — OFFICE VISIT (OUTPATIENT)
Dept: FAMILY MEDICINE CLINIC | Facility: CLINIC | Age: 65
End: 2020-02-17
Payer: MEDICARE

## 2020-02-17 ENCOUNTER — LAB ENCOUNTER (OUTPATIENT)
Dept: LAB | Age: 65
End: 2020-02-17
Attending: FAMILY MEDICINE
Payer: MEDICARE

## 2020-02-17 VITALS
TEMPERATURE: 99 F | DIASTOLIC BLOOD PRESSURE: 70 MMHG | WEIGHT: 118.38 LBS | BODY MASS INDEX: 22.07 KG/M2 | HEIGHT: 61.5 IN | HEART RATE: 75 BPM | SYSTOLIC BLOOD PRESSURE: 152 MMHG

## 2020-02-17 DIAGNOSIS — I65.23 ATHEROSCLEROSIS OF BOTH CAROTID ARTERIES: ICD-10-CM

## 2020-02-17 DIAGNOSIS — L81.4 SOLAR LENTIGO: ICD-10-CM

## 2020-02-17 DIAGNOSIS — Z00.00 ENCOUNTER FOR ANNUAL HEALTH EXAMINATION: ICD-10-CM

## 2020-02-17 DIAGNOSIS — Z11.59 NEED FOR HEPATITIS C SCREENING TEST: ICD-10-CM

## 2020-02-17 DIAGNOSIS — Z11.4 SCREENING FOR HIV (HUMAN IMMUNODEFICIENCY VIRUS): ICD-10-CM

## 2020-02-17 DIAGNOSIS — E78.2 MIXED HYPERLIPIDEMIA: ICD-10-CM

## 2020-02-17 DIAGNOSIS — Z23 NEED FOR VACCINATION: ICD-10-CM

## 2020-02-17 DIAGNOSIS — M85.80 OSTEOPENIA, UNSPECIFIED LOCATION: ICD-10-CM

## 2020-02-17 DIAGNOSIS — L71.9 ROSACEA: ICD-10-CM

## 2020-02-17 DIAGNOSIS — Z00.00 ADULT GENERAL MEDICAL EXAM: Primary | ICD-10-CM

## 2020-02-17 LAB
ALBUMIN SERPL-MCNC: 4 G/DL (ref 3.4–5)
ALBUMIN/GLOB SERPL: 1.1 {RATIO} (ref 1–2)
ALP LIVER SERPL-CCNC: 90 U/L (ref 50–130)
ALT SERPL-CCNC: 25 U/L (ref 13–56)
ANION GAP SERPL CALC-SCNC: 3 MMOL/L (ref 0–18)
AST SERPL-CCNC: 33 U/L (ref 15–37)
BASOPHILS # BLD AUTO: 0.06 X10(3) UL (ref 0–0.2)
BASOPHILS NFR BLD AUTO: 1.6 %
BILIRUB SERPL-MCNC: 1.1 MG/DL (ref 0.1–2)
BUN BLD-MCNC: 14 MG/DL (ref 7–18)
BUN/CREAT SERPL: 19.4 (ref 10–20)
CALCIUM BLD-MCNC: 9 MG/DL (ref 8.5–10.1)
CHLORIDE SERPL-SCNC: 108 MMOL/L (ref 98–112)
CHOLEST SMN-MCNC: 282 MG/DL (ref ?–200)
CO2 SERPL-SCNC: 30 MMOL/L (ref 21–32)
CREAT BLD-MCNC: 0.72 MG/DL (ref 0.55–1.02)
DEPRECATED RDW RBC AUTO: 44 FL (ref 35.1–46.3)
EOSINOPHIL # BLD AUTO: 0.07 X10(3) UL (ref 0–0.7)
EOSINOPHIL NFR BLD AUTO: 1.9 %
ERYTHROCYTE [DISTWIDTH] IN BLOOD BY AUTOMATED COUNT: 12.2 % (ref 11–15)
GLOBULIN PLAS-MCNC: 3.7 G/DL (ref 2.8–4.4)
GLUCOSE BLD-MCNC: 83 MG/DL (ref 70–99)
HCT VFR BLD AUTO: 41.4 % (ref 35–48)
HCV AB SERPL QL IA: NONREACTIVE
HDLC SERPL-MCNC: 89 MG/DL (ref 40–59)
HGB BLD-MCNC: 14.2 G/DL (ref 12–16)
IMM GRANULOCYTES # BLD AUTO: 0 X10(3) UL (ref 0–1)
IMM GRANULOCYTES NFR BLD: 0 %
LDLC SERPL CALC-MCNC: 172 MG/DL (ref ?–100)
LYMPHOCYTES # BLD AUTO: 1.27 X10(3) UL (ref 1–4)
LYMPHOCYTES NFR BLD AUTO: 34.1 %
M PROTEIN MFR SERPL ELPH: 7.7 G/DL (ref 6.4–8.2)
MCH RBC QN AUTO: 33.6 PG (ref 26–34)
MCHC RBC AUTO-ENTMCNC: 34.3 G/DL (ref 31–37)
MCV RBC AUTO: 97.9 FL (ref 80–100)
MONOCYTES # BLD AUTO: 0.45 X10(3) UL (ref 0.1–1)
MONOCYTES NFR BLD AUTO: 12.1 %
NEUTROPHILS # BLD AUTO: 1.87 X10 (3) UL (ref 1.5–7.7)
NEUTROPHILS # BLD AUTO: 1.87 X10(3) UL (ref 1.5–7.7)
NEUTROPHILS NFR BLD AUTO: 50.3 %
NONHDLC SERPL-MCNC: 193 MG/DL (ref ?–130)
OSMOLALITY SERPL CALC.SUM OF ELEC: 292 MOSM/KG (ref 275–295)
PATIENT FASTING Y/N/NP: YES
PATIENT FASTING Y/N/NP: YES
PLATELET # BLD AUTO: 242 10(3)UL (ref 150–450)
POTASSIUM SERPL-SCNC: 4.2 MMOL/L (ref 3.5–5.1)
RBC # BLD AUTO: 4.23 X10(6)UL (ref 3.8–5.3)
SODIUM SERPL-SCNC: 141 MMOL/L (ref 136–145)
TRIGL SERPL-MCNC: 106 MG/DL (ref 30–149)
TSI SER-ACNC: 1.49 MIU/ML (ref 0.36–3.74)
VLDLC SERPL CALC-MCNC: 21 MG/DL (ref 0–30)
WBC # BLD AUTO: 3.7 X10(3) UL (ref 4–11)

## 2020-02-17 PROCEDURE — 80053 COMPREHEN METABOLIC PANEL: CPT

## 2020-02-17 PROCEDURE — 90471 IMMUNIZATION ADMIN: CPT | Performed by: FAMILY MEDICINE

## 2020-02-17 PROCEDURE — 80061 LIPID PANEL: CPT

## 2020-02-17 PROCEDURE — 86803 HEPATITIS C AB TEST: CPT

## 2020-02-17 PROCEDURE — 93010 ELECTROCARDIOGRAM REPORT: CPT | Performed by: FAMILY MEDICINE

## 2020-02-17 PROCEDURE — 87389 HIV-1 AG W/HIV-1&-2 AB AG IA: CPT

## 2020-02-17 PROCEDURE — 36415 COLL VENOUS BLD VENIPUNCTURE: CPT

## 2020-02-17 PROCEDURE — 85025 COMPLETE CBC W/AUTO DIFF WBC: CPT

## 2020-02-17 PROCEDURE — 84443 ASSAY THYROID STIM HORMONE: CPT

## 2020-02-17 PROCEDURE — G0009 ADMIN PNEUMOCOCCAL VACCINE: HCPCS | Performed by: FAMILY MEDICINE

## 2020-02-17 PROCEDURE — 90670 PCV13 VACCINE IM: CPT | Performed by: FAMILY MEDICINE

## 2020-02-17 PROCEDURE — G0402 INITIAL PREVENTIVE EXAM: HCPCS | Performed by: FAMILY MEDICINE

## 2020-02-17 PROCEDURE — 90750 HZV VACC RECOMBINANT IM: CPT | Performed by: FAMILY MEDICINE

## 2020-02-17 PROCEDURE — 93005 ELECTROCARDIOGRAM TRACING: CPT

## 2020-02-17 NOTE — PATIENT INSTRUCTIONS
Your blood pressure was a little bit elevated here but a bit unusual.  Go ahead and check it even at home or your gynecologist office the next time you see them and if it is again high please let me know. -You would be really anything greater than 130 systo screening (once between ages 73-68) IPPE only No results found for this or any previous visit.  Limited to patients who meet one of the following criteria:   • Men who are 73-68 years old and have smoked more than 100 cigarettes in their lifetime   • Anyone Mammogram due on 05/08/1994 Please get this Mammogram regularly   Immunizations      Influenza  Covered Annually No orders found for this or any previous visit.  Please get every year    Pneumococcal 13 (Prevnar)  Covered Once after 65 Orders placed or perf Mexican)  www. [email or web address]. org  This link also has information from the [address] [place] [address] regarding Advance Directives.

## 2020-02-17 NOTE — PROGRESS NOTES
HPI:   Patsy Jacobs is a 71year old female who presents for a Medicare Initial Preventative Physical Exam (Welcome to Medicare- < 12 months on Medicare). Pt is  and does not smoke. She is retired. She has a living will.  She is very active and Physical due on 2018         Fall/Risk Assessment   She has been screened for Falls and is low risk: Fall/Risk Scorin    Cognitive Assessment             Functional Ability/Status   Yareli Roe has a completely normal functional assessment!   P from Last 3 Encounters:  02/17/20 : 118 lb 6.4 oz (53.7 kg)  01/14/20 : 116 lb (52.6 kg)  12/16/19 : 119 lb (54 kg)     Last Cholesterol Labs:   Lab Results   Component Value Date    CHOLEST 243 (H) 08/26/2017    HDL 87 08/26/2017     (H) 08/26/2017 and speech difficulty. Psychiatric/Behavioral: Negative for depressed mood. The patient is not nervous/anxious.            EXAM:   /81   Pulse 75   Temp 99.4 °F (37.4 °C) (Oral)   Ht 5' 1.5\" (1.562 m)   Wt 118 lb 6.4 oz (53.7 kg)   BMI 22.01 kg/m² canal normal.   Left Ear: Tympanic membrane, external ear and ear canal normal.   Nose: No mucosal edema or rhinorrhea. THROAT: Oropharynx is clear without exudate. Eyes: Conjunctivae and EOM are normal.   Neck: Normal range of motion.  No thyromegaly p very active and has no chest pain or shortness of breath. She exercises very regularly. Mixed hyperlipidemia  -     CBC WITH DIFFERENTIAL WITH PLATELET; Future  -     COMP METABOLIC PANEL (14);  Future  -     ASSAY, THYROID STIM HORMONE; Future  -     LIP Covered at least every 3 years,         Glucose (mg/dL)   Date Value   08/26/2017 93     GLUCOSE (P) (mg/dL)   Date Value   03/19/2016 92    Medicare covers annually or at 6-month intervals for prediabetic patients        Cardiovascular Disease Screening after age 72    Covered yearly for Long term Glucocorticoid medication (Steroids) Requires diagnosis related to osteoporosis or estrogen deficiency.    Biennial benefit unless patient has history of long-term glucocorticoid use for medical condition    No r prescription benefits, but Medicare does not cover unless Medically needed    Zoster (Not covered by Medicare Part B) No orders found for this or any previous visit.  This may be covered with your pharmacy  prescription benefits     Recommended Websites for Value   08/26/2017 93     GLUCOSE (P) (mg/dL)   Date Value   03/19/2016 92          Cardiovascular Disease Screening     LDL Annually LDL Cholesterol (mg/dL)   Date Value   08/26/2017 145 (H)   03/19/2016 184 (H)        EKG - w/ Initial Preventative Physic for the mentally retarded   Persons who live in the same house as a HepB virus carrier   Homosexual men   Illicit injectable drug abusers     Tetanus Toxoid  Only covered with a cut with metal- TD and TDaP Not covered by Medicare Part B) 05/02/2009 This ma

## 2020-06-15 ENCOUNTER — TELEPHONE (OUTPATIENT)
Dept: FAMILY MEDICINE CLINIC | Facility: CLINIC | Age: 65
End: 2020-06-15

## 2020-06-15 NOTE — TELEPHONE ENCOUNTER
Patients  came to office and dropped off blood pressure log sheet.   2/17 - 152/70  2/26 - 130/70  3/31 - 122/70  4/30 - 120/70  5/20 - 138/72

## 2021-01-26 ENCOUNTER — OFFICE VISIT (OUTPATIENT)
Dept: FAMILY MEDICINE CLINIC | Facility: CLINIC | Age: 66
End: 2021-01-26
Payer: MEDICARE

## 2021-01-26 VITALS
SYSTOLIC BLOOD PRESSURE: 135 MMHG | HEART RATE: 118 BPM | WEIGHT: 120.19 LBS | BODY MASS INDEX: 22.69 KG/M2 | DIASTOLIC BLOOD PRESSURE: 78 MMHG | TEMPERATURE: 98 F | HEIGHT: 61 IN

## 2021-01-26 DIAGNOSIS — R10.2 VULVAR PAIN: Primary | ICD-10-CM

## 2021-01-26 DIAGNOSIS — R68.89 FORGETFULNESS: ICD-10-CM

## 2021-01-26 DIAGNOSIS — M79.652 PAIN IN BOTH THIGHS: ICD-10-CM

## 2021-01-26 DIAGNOSIS — R10.84 GENERALIZED ABDOMINAL PAIN: ICD-10-CM

## 2021-01-26 DIAGNOSIS — M79.651 PAIN IN BOTH THIGHS: ICD-10-CM

## 2021-01-26 PROCEDURE — 99214 OFFICE O/P EST MOD 30 MIN: CPT | Performed by: FAMILY MEDICINE

## 2021-01-26 RX ORDER — LIDOCAINE 50 MG/G
OINTMENT TOPICAL
COMMUNITY
Start: 2020-10-06 | End: 2021-10-05

## 2021-01-26 RX ORDER — CONJUGATED ESTROGENS 0.62 MG/G
CREAM VAGINAL
COMMUNITY
Start: 2021-01-08 | End: 2021-06-01

## 2021-01-26 RX ORDER — GABAPENTIN 100 MG/1
CAPSULE ORAL
Qty: 180 CAPSULE | Refills: 0 | Status: SHIPPED | OUTPATIENT
Start: 2021-01-26

## 2021-01-26 RX ORDER — CHLORHEXIDINE/GLYCERIN/HE-CELL
1 JELLY (GRAM) TOPICAL
COMMUNITY

## 2021-01-26 NOTE — PROGRESS NOTES
Patient ID: Tyrese Prado is a 76year old female. HPI  Patient presents with:  Vaginal Problem: pain , states she saw gyne and would like a second opinion    Last seen by me on 2/17/2020. Pt presents today with her .     Pt c/o of intermittent encouraged the pt to begin exercising again.  states that her memory has been worse lately. He states that he will tell her something, and she will ask about it just a few minutes later.  Pt states that she retired a year and a half ago and worked [place], [employer]          Current Outpatient Medications   Medication Sig Dispense Refill   • PEG 3350-KCl-Na Bicarb-NaCl 420 g Oral Recon Soln Take as directed per MD 4000 mL 0   • Multiple Vitamins-Minerals (MULTI-VITAMIN/MINERALS) Oral Tab Take 1 tablet by m gabapentin 100 MG Oral Cap; Take 1-2 p.o. at bedtime for discomfort    Generalized abdominal pain  -     gabapentin 100 MG Oral Cap; Take 1-2 p.o. at bedtime for discomfort    Forgetfulness  This looks more like benign forgetfulness.   I told her to look on

## 2021-06-01 ENCOUNTER — OFFICE VISIT (OUTPATIENT)
Dept: NEUROLOGY | Facility: CLINIC | Age: 66
End: 2021-06-01
Payer: MEDICARE

## 2021-06-01 VITALS
WEIGHT: 117 LBS | HEART RATE: 88 BPM | DIASTOLIC BLOOD PRESSURE: 64 MMHG | HEIGHT: 61 IN | SYSTOLIC BLOOD PRESSURE: 110 MMHG | BODY MASS INDEX: 22.09 KG/M2

## 2021-06-01 DIAGNOSIS — R41.3 COMPLAINT OF MEMORY DISORDER WITHOUT OBSERVED OBJECTIVE MEMORY DEFICIT: Primary | ICD-10-CM

## 2021-06-01 PROCEDURE — 99215 OFFICE O/P EST HI 40 MIN: CPT | Performed by: OTHER

## 2021-06-01 NOTE — PROGRESS NOTES
Efra Dub 36  NEW PATIENT EVALUATION  Reason for Admission/Consultation:  Cognitive decline     Requested by: No referring provider defined for this encounter.     PCP: Yesenia Ivey DO  Chief Complaint: Memory Loss (New patient- Patient of depression. Temporal Course:  Symptoms began:  2 yrs ago since she retried   Onset: Insidious  Course: unchanged     Functional activities questionnaire Score (0 to 3)   1. Writing checks and maintaining other financial records N/A   2.  Assembling t No  -These changes have been gradually progressive since onset Yes    Long-term Memory:  -Difficulty remembering distant events from the past like childhood, previous employment, wedding No     Orientation:   -Oriented?  Yes but states since she retired she night before? No  -Does he/she tend to stare off into space for periods of time? No  -Does he/she sleep more than 2 hours before 7 pm? No  -Does his/her flow of ideas appear disorganized or illogical at times?  No    Neuropsychiatric  -Visual hallucinations Rfl:   •  gabapentin 100 MG Oral Cap, Take 1-2 p.o. at bedtime for discomfort (Patient not taking: Reported on 6/1/2021 ), Disp: 180 capsule, Rfl: 0  •  Multiple Vitamins-Minerals (MULTI-VITAMIN/MINERALS) Oral Tab, Take 1 tablet by mouth daily.  (Patient no Dementia Mother    • Dementia Father    • Colon Cancer Maternal Grandmother    • Cancer Sister         vaginal       Family history of neurodegenerative disease or neurological disease: both her parents had dementia when they ; pt states they were in t rAPD.No ptosis. No difficulty with initiation of saccades. No slow saccade velocity. No limitation of extraocular movements, particularly vertical gaze/downgaze. EOMI. No nystagmus. No bitemporal atrophy/wasting. Normal masticatory muscle bulk . V1-V3 swing.  - Plantar response: flexor bilaterally    Data reviewed    Most recent lab results/Test results/Imaging:     Lab Results   Component Value Date    TSH 1.490 02/17/2020     Lab Results   Component Value Date    HDL 89 (H) 02/17/2020     (H) 0 with the patient and her . Note: Amnestic MCI in particular often represents a prodromal form of Alzheimer's dementia, although it is possible that reversion to cognitive normalcy also occurs with some frequency.     Patterns seen on cognitive t for those aged 54 to 59, and every year or two after the age of 72  o Hearing loss: Patient has not had a hearing test.  Will refer the next visit. The American Speech-Language-Hearing-Association recommends a hearing test  q3 years after the age of 48  o H medical attention immediately if symptoms worsen. Patient verbalized understanding of information given. All questions were answered. All side effects of drugs were discussed.      Time spent for examination, counseling and coordination of care such as pot

## 2021-06-01 NOTE — PATIENT INSTRUCTIONS
Caregiver Resources:    [facility]):     of Senior Services: Sabas Dotson   Phone:   [phone] or  [phone]  Fax:   [phone]  TDD:   [phone]  Office Hours:   8 a.m. - 4:30 p.m.   Monday - from the heart can also affect the brain. What are the symptoms of mild cognitive impairment? A person with MCI is often the first to notice symptoms. Family members and friends may also note declines in memory or thinking.    There are 2 types of MCI, b your risk factors for heart disease, such as high blood pressure and high cholesterol. · Do activities that challenge or engage your mind, such as puzzles, games, or hobbies. · Stay social. Being around others can help keep your mind active.     Make sure

## 2021-06-04 ENCOUNTER — LAB ENCOUNTER (OUTPATIENT)
Dept: LAB | Age: 66
End: 2021-06-04
Attending: FAMILY MEDICINE
Payer: MEDICARE

## 2021-06-04 DIAGNOSIS — R41.3 COMPLAINT OF MEMORY DISORDER WITHOUT OBSERVED OBJECTIVE MEMORY DEFICIT: ICD-10-CM

## 2021-06-04 PROCEDURE — 82607 VITAMIN B-12: CPT

## 2021-06-04 PROCEDURE — 84443 ASSAY THYROID STIM HORMONE: CPT

## 2021-06-04 PROCEDURE — 83921 ORGANIC ACID SINGLE QUANT: CPT

## 2021-06-04 PROCEDURE — 36415 COLL VENOUS BLD VENIPUNCTURE: CPT

## 2021-06-04 PROCEDURE — 86780 TREPONEMA PALLIDUM: CPT

## 2021-07-13 ENCOUNTER — PATIENT MESSAGE (OUTPATIENT)
Dept: FAMILY MEDICINE CLINIC | Facility: CLINIC | Age: 66
End: 2021-07-13

## 2021-07-14 ENCOUNTER — PATIENT MESSAGE (OUTPATIENT)
Dept: FAMILY MEDICINE CLINIC | Facility: CLINIC | Age: 66
End: 2021-07-14

## 2021-07-14 DIAGNOSIS — Z12.31 ENCOUNTER FOR SCREENING MAMMOGRAM FOR BREAST CANCER: Primary | ICD-10-CM

## 2021-07-14 NOTE — TELEPHONE ENCOUNTER
From: Tariq Brown  To: Valentino Bali, DO  Sent: 7/14/2021 6:08 AM CDT  Subject: Referral Request    April:    I usually schedule through my gynecologist but they now referred me to [facility]. I'd rather have it done closer to home.  It has been over a year sin

## 2021-08-03 ENCOUNTER — HOSPITAL ENCOUNTER (OUTPATIENT)
Dept: MAMMOGRAPHY | Age: 66
Discharge: HOME | End: 2021-08-03
Attending: FAMILY MEDICINE
Payer: MEDICARE

## 2021-08-03 DIAGNOSIS — Z12.31 ENCOUNTER FOR SCREENING MAMMOGRAM FOR BREAST CANCER: ICD-10-CM

## 2021-08-03 PROCEDURE — 77067 SCR MAMMO BI INCL CAD: CPT | Performed by: FAMILY MEDICINE

## 2021-08-03 PROCEDURE — 77063 BREAST TOMOSYNTHESIS BI: CPT | Performed by: FAMILY MEDICINE

## 2021-10-05 ENCOUNTER — LAB ENCOUNTER (OUTPATIENT)
Dept: LAB | Age: 66
End: 2021-10-05
Attending: FAMILY MEDICINE
Payer: MEDICARE

## 2021-10-05 ENCOUNTER — OFFICE VISIT (OUTPATIENT)
Dept: FAMILY MEDICINE CLINIC | Facility: CLINIC | Age: 66
End: 2021-10-05
Payer: MEDICARE

## 2021-10-05 VITALS
BODY MASS INDEX: 22.54 KG/M2 | SYSTOLIC BLOOD PRESSURE: 127 MMHG | WEIGHT: 119.38 LBS | HEART RATE: 87 BPM | DIASTOLIC BLOOD PRESSURE: 72 MMHG | HEIGHT: 61 IN | TEMPERATURE: 99 F

## 2021-10-05 DIAGNOSIS — R41.3 MEMORY LOSS: ICD-10-CM

## 2021-10-05 DIAGNOSIS — I65.21 CAROTID ARTERY PLAQUE, RIGHT: Primary | ICD-10-CM

## 2021-10-05 DIAGNOSIS — D72.819 LEUKOPENIA, UNSPECIFIED TYPE: ICD-10-CM

## 2021-10-05 DIAGNOSIS — F43.22 ADJUSTMENT DISORDER WITH ANXIOUS MOOD: ICD-10-CM

## 2021-10-05 DIAGNOSIS — R13.10 DYSPHAGIA, UNSPECIFIED TYPE: ICD-10-CM

## 2021-10-05 DIAGNOSIS — R10.2 VULVAR PAIN: ICD-10-CM

## 2021-10-05 PROCEDURE — 99215 OFFICE O/P EST HI 40 MIN: CPT | Performed by: FAMILY MEDICINE

## 2021-10-05 PROCEDURE — 85025 COMPLETE CBC W/AUTO DIFF WBC: CPT

## 2021-10-05 PROCEDURE — 36415 COLL VENOUS BLD VENIPUNCTURE: CPT

## 2021-10-05 NOTE — PROGRESS NOTES
Patient ID: Ian Christianson is a 78year old female. HPI  Patient presents with:  Test Results: lifeline screening    Last seen by me on 1/26/2021; I reviewed the note. Pt presents today with her . Pt does not smoke. She is exercising.     Pt had lifeline screening done on 9/2/2021. Pt had mild plaque in the right carotid. She denies dizziness and weakness. She did not have atrial fibrillation or peripheral artery disease. Her heart rates was 83 beats per minute.  Pt's CLINT was normal. Her BMI wa Physical Exam:       Physical Exam  Blood pressure 129/82, pulse 87, temperature 98.5 °F (36.9 °C), temperature source Temporal, height 5' 1\" (1.549 m), weight 119 lb 6.4 oz, not currently breastfeeding.    10/05/21  1348 10/05/21  1414 Dr. Esqueda Cannon Ball the neurologist.  If it does not get better she will need to see GI. Brought up at the end of the visit. Referrals (if applicable)  No orders of the defined types were placed in this encounter.       Follow up if symptoms persist.  Take med

## 2022-01-05 ENCOUNTER — APPOINTMENT (OUTPATIENT)
Dept: GENERAL RADIOLOGY | Age: 67
End: 2022-01-05
Attending: NURSE PRACTITIONER
Payer: MEDICARE

## 2022-01-05 ENCOUNTER — HOSPITAL ENCOUNTER (OUTPATIENT)
Age: 67
Discharge: HOME | End: 2022-01-05
Payer: MEDICARE

## 2022-01-05 VITALS
HEART RATE: 84 BPM | TEMPERATURE: 98 F | DIASTOLIC BLOOD PRESSURE: 71 MMHG | RESPIRATION RATE: 18 BRPM | OXYGEN SATURATION: 98 % | SYSTOLIC BLOOD PRESSURE: 134 MMHG

## 2022-01-05 DIAGNOSIS — S52.022A CLOSED FRACTURE OF OLECRANON PROCESS OF LEFT ULNA, INITIAL ENCOUNTER: Primary | ICD-10-CM

## 2022-01-05 PROCEDURE — 99204 OFFICE O/P NEW MOD 45 MIN: CPT

## 2022-01-05 PROCEDURE — 99203 OFFICE O/P NEW LOW 30 MIN: CPT

## 2022-01-05 PROCEDURE — 73110 X-RAY EXAM OF WRIST: CPT | Performed by: NURSE PRACTITIONER

## 2022-01-05 PROCEDURE — 99214 OFFICE O/P EST MOD 30 MIN: CPT

## 2022-01-05 PROCEDURE — 73090 X-RAY EXAM OF FOREARM: CPT | Performed by: NURSE PRACTITIONER

## 2022-01-05 PROCEDURE — 29105 APPLICATION LONG ARM SPLINT: CPT

## 2022-01-05 NOTE — ED PROVIDER NOTES
Patient Seen in: [facility]      History   Patient presents with:  Contusion    Stated Complaint: fell on ice-bruise on entire arm    Subjective:   HPI    This is a well appearing 79 y/o who presents with a chief complaint of bruising, swelli nursing note reviewed. Constitutional:       General: She is awake. She is not in acute distress. Appearance: Normal appearance. She is not ill-appearing, toxic-appearing or diaphoretic. HENT:      Head: Normocephalic and atraumatic.       Right Ear ([id number])    Result Date: 1/5/2022  CONCLUSION:  1. Olecranon fracture.      Dictated by (CST): Celestino Nunez MD on 1/05/2022 at 4:17 PM     Finalized by (CST): Celestino Nunez MD on 1/05/2022 at 4:18 PM          XR WRIST COMPLETE (MIN 3

## 2022-01-05 NOTE — ED INITIAL ASSESSMENT (HPI)
Patient with left arm bruising and swelling after slipping and falling on the ice on Monday. Also reports some discomfort to her back.

## 2022-01-06 ENCOUNTER — TELEPHONE (OUTPATIENT)
Dept: ORTHOPEDICS CLINIC | Facility: CLINIC | Age: 67
End: 2022-01-06

## 2022-01-06 NOTE — TELEPHONE ENCOUNTER
Per pts spouse pt was seen at [facility] for ulna fracture, needs appt soon, no openings found. Please advise thank you.

## 2022-01-06 NOTE — TELEPHONE ENCOUNTER
ADONIS verified, Delfina Anthony contacted. Appointment scheduled 1/7/22 at 1:00. Location provided.

## 2022-01-06 NOTE — TELEPHONE ENCOUNTER
Ok to add on for my clinic tomorrow, Monday or Tuesday. Plan will be for surgery next week Wednesday or Thursday.

## 2022-01-07 ENCOUNTER — OFFICE VISIT (OUTPATIENT)
Dept: ORTHOPEDICS CLINIC | Facility: CLINIC | Age: 67
End: 2022-01-07
Payer: MEDICARE

## 2022-01-07 ENCOUNTER — TELEPHONE (OUTPATIENT)
Dept: ORTHOPEDICS CLINIC | Facility: CLINIC | Age: 67
End: 2022-01-07

## 2022-01-07 DIAGNOSIS — S52.022A CLOSED FRACTURE OF OLECRANON PROCESS OF LEFT ULNA, INITIAL ENCOUNTER: Primary | ICD-10-CM

## 2022-01-07 PROCEDURE — 99204 OFFICE O/P NEW MOD 45 MIN: CPT | Performed by: ORTHOPAEDIC SURGERY

## 2022-01-07 PROCEDURE — 29105 APPLICATION LONG ARM SPLINT: CPT | Performed by: ORTHOPAEDIC SURGERY

## 2022-01-07 NOTE — TELEPHONE ENCOUNTER
Type of surgery:  Left elbow olecranon fracture, open reduction internal fixation  Date: 1/12/22  Location: [facility]  Medical Clearance:      *Medical: Yes - appt 1/8/22      *Dental:      *Other:  Prior Authorization Status: STAT  Workers Comp:  Medacta/Zimme

## 2022-01-08 ENCOUNTER — HOSPITAL ENCOUNTER (OUTPATIENT)
Dept: GENERAL RADIOLOGY | Age: 67
Discharge: HOME | End: 2022-01-08
Attending: FAMILY MEDICINE
Payer: MEDICARE

## 2022-01-08 ENCOUNTER — LAB ENCOUNTER (OUTPATIENT)
Dept: LAB | Age: 67
End: 2022-01-08
Attending: FAMILY MEDICINE
Payer: MEDICARE

## 2022-01-08 ENCOUNTER — OFFICE VISIT (OUTPATIENT)
Dept: FAMILY MEDICINE CLINIC | Facility: CLINIC | Age: 67
End: 2022-01-08
Payer: MEDICARE

## 2022-01-08 VITALS
TEMPERATURE: 98 F | HEART RATE: 86 BPM | HEIGHT: 61 IN | DIASTOLIC BLOOD PRESSURE: 78 MMHG | WEIGHT: 122.63 LBS | SYSTOLIC BLOOD PRESSURE: 127 MMHG | BODY MASS INDEX: 23.15 KG/M2

## 2022-01-08 DIAGNOSIS — R07.81 RIB PAIN ON LEFT SIDE: ICD-10-CM

## 2022-01-08 DIAGNOSIS — E78.2 MIXED HYPERLIPIDEMIA: ICD-10-CM

## 2022-01-08 DIAGNOSIS — Z01.818 PREOP EXAMINATION: ICD-10-CM

## 2022-01-08 DIAGNOSIS — S52.022A CLOSED FRACTURE OF OLECRANON PROCESS OF LEFT ULNA, INITIAL ENCOUNTER: Primary | ICD-10-CM

## 2022-01-08 DIAGNOSIS — S52.022A CLOSED FRACTURE OF OLECRANON PROCESS OF LEFT ULNA, INITIAL ENCOUNTER: ICD-10-CM

## 2022-01-08 LAB
ALBUMIN SERPL-MCNC: 3.6 G/DL (ref 3.4–5)
ALBUMIN/GLOB SERPL: 1.2 {RATIO} (ref 1–2)
ALP LIVER SERPL-CCNC: 92 U/L
ALT SERPL-CCNC: 33 U/L
ANION GAP SERPL CALC-SCNC: 6 MMOL/L (ref 0–18)
AST SERPL-CCNC: 31 U/L (ref 15–37)
BASOPHILS # BLD AUTO: 0.06 X10(3) UL (ref 0–0.2)
BASOPHILS NFR BLD AUTO: 1.5 %
BILIRUB SERPL-MCNC: 1.5 MG/DL (ref 0.1–2)
BUN BLD-MCNC: 14 MG/DL (ref 7–18)
BUN/CREAT SERPL: 18.2 (ref 10–20)
CALCIUM BLD-MCNC: 9.2 MG/DL (ref 8.5–10.1)
CHLORIDE SERPL-SCNC: 104 MMOL/L (ref 98–112)
CO2 SERPL-SCNC: 27 MMOL/L (ref 21–32)
CREAT BLD-MCNC: 0.77 MG/DL
DEPRECATED RDW RBC AUTO: 43.5 FL (ref 35.1–46.3)
EOSINOPHIL # BLD AUTO: 0.07 X10(3) UL (ref 0–0.7)
EOSINOPHIL NFR BLD AUTO: 1.8 %
ERYTHROCYTE [DISTWIDTH] IN BLOOD BY AUTOMATED COUNT: 12 % (ref 11–15)
FASTING STATUS PATIENT QL REPORTED: YES
GLOBULIN PLAS-MCNC: 3.1 G/DL (ref 2.8–4.4)
GLUCOSE BLD-MCNC: 97 MG/DL (ref 70–99)
HCT VFR BLD AUTO: 34.2 %
HGB BLD-MCNC: 11.4 G/DL
IMM GRANULOCYTES # BLD AUTO: 0.01 X10(3) UL (ref 0–1)
IMM GRANULOCYTES NFR BLD: 0.3 %
LYMPHOCYTES # BLD AUTO: 0.7 X10(3) UL (ref 1–4)
LYMPHOCYTES NFR BLD AUTO: 18 %
MCH RBC QN AUTO: 32.9 PG (ref 26–34)
MCHC RBC AUTO-ENTMCNC: 33.3 G/DL (ref 31–37)
MCV RBC AUTO: 98.6 FL
MONOCYTES # BLD AUTO: 0.41 X10(3) UL (ref 0.1–1)
MONOCYTES NFR BLD AUTO: 10.5 %
NEUTROPHILS # BLD AUTO: 2.64 X10 (3) UL (ref 1.5–7.7)
NEUTROPHILS # BLD AUTO: 2.64 X10(3) UL (ref 1.5–7.7)
NEUTROPHILS NFR BLD AUTO: 67.9 %
OSMOLALITY SERPL CALC.SUM OF ELEC: 284 MOSM/KG (ref 275–295)
PLATELET # BLD AUTO: 258 10(3)UL (ref 150–450)
POTASSIUM SERPL-SCNC: 4.3 MMOL/L (ref 3.5–5.1)
PROT SERPL-MCNC: 6.7 G/DL (ref 6.4–8.2)
RBC # BLD AUTO: 3.47 X10(6)UL
SODIUM SERPL-SCNC: 137 MMOL/L (ref 136–145)
WBC # BLD AUTO: 3.9 X10(3) UL (ref 4–11)

## 2022-01-08 PROCEDURE — 85025 COMPLETE CBC W/AUTO DIFF WBC: CPT

## 2022-01-08 PROCEDURE — 71101 X-RAY EXAM UNILAT RIBS/CHEST: CPT | Performed by: FAMILY MEDICINE

## 2022-01-08 PROCEDURE — 99214 OFFICE O/P EST MOD 30 MIN: CPT | Performed by: FAMILY MEDICINE

## 2022-01-08 PROCEDURE — 93010 ELECTROCARDIOGRAM REPORT: CPT | Performed by: FAMILY MEDICINE

## 2022-01-08 PROCEDURE — 80053 COMPREHEN METABOLIC PANEL: CPT

## 2022-01-08 PROCEDURE — 36415 COLL VENOUS BLD VENIPUNCTURE: CPT

## 2022-01-08 PROCEDURE — 93005 ELECTROCARDIOGRAM TRACING: CPT

## 2022-01-08 NOTE — H&P
NURSING INTAKE COMMENTS: Patient presents with:  New Patient: Patient fell on ice on Monday 1/3/22, had xrays completed on 1/7/22 left forearm. Patient has arm sling, she is not wearing it today. There is swelling and bruising.  Patient denies any pain, as used    Substance and Sexual Activity      Alcohol use:  Yes        Alcohol/week: 0.0 standard drinks        Comment: wine, 2-3 glasses daily      Drug use: No      Sexual activity: Not on file       Review of Systems:  GENERAL: feels generally well, no rec displacement of fracture fragment. SOFT TISSUES: Negative. No visible soft tissue swelling. EFFUSION: None visible. OTHER: Negative. CONCLUSION:  1. Olecranon fracture.      Dictated by (CST): Tatiana Tom MD on 1/05/2022 at 4:17 PM     F technology. Please excuse any typos.     Nadya Brown MD

## 2022-01-08 NOTE — PROGRESS NOTES
Patient ID: Cathy Fuller is a 78year old female. HPI  Patient presents with:  Pre-Op Exam: surgery 1/12/22    Last seen by me on 10/5/2021. Pt presents today with her .      Pt presents today for a pre-op exam. Pt will be having surgery pe Procedure: COLONOSCOPY, POSSIBLE BIOPSY, POSSIBLE POLYPECTOMY 20619;  Surgeon: Maday Stern MD;  Location: [address] [place] [address]   • [facility] LOCALIZATION WIRE 1 SITE RIGHT ([id number])            Current Outpatient Medications   Medication Sig Dispense EKG 12-LEAD; Future  Labs ordered. Thank you Dr. Maryanne Hernandez for allowing me to be a part of Bonnie's care. Rib pain on left side  -     XR RIBS WITH CHEST (3 VIEWS), LEFT  (CPT=71101);  Future  We will order rib x-rays just to make sure there is no fractur

## 2022-01-09 ENCOUNTER — TELEPHONE (OUTPATIENT)
Dept: FAMILY MEDICINE CLINIC | Facility: CLINIC | Age: 67
End: 2022-01-09

## 2022-01-09 NOTE — TELEPHONE ENCOUNTER
Mehdi Spivey saw our mutual patient Koko Delacruz and she is approved for surgery. Her labs and EKG are in the system. EKG is stable and she has had a prior stress test and is extremely active.     Day Hopper

## 2022-01-10 ENCOUNTER — LAB REQUISITION (OUTPATIENT)
Dept: SURGERY | Age: 67
End: 2022-01-10
Payer: MEDICARE

## 2022-01-10 DIAGNOSIS — Z01.818 PREOP EXAMINATION: ICD-10-CM

## 2022-01-11 LAB — SARS-COV-2 RNA RESP QL NAA+PROBE: NOT DETECTED

## 2022-01-12 ENCOUNTER — SURGERY CENTER DOCUMENTATION (OUTPATIENT)
Dept: SURGERY | Age: 67
End: 2022-01-12

## 2022-01-12 ENCOUNTER — TELEPHONE (OUTPATIENT)
Dept: ORTHOPEDICS CLINIC | Facility: CLINIC | Age: 67
End: 2022-01-12

## 2022-01-12 DIAGNOSIS — Z47.89 ORTHOPEDIC AFTERCARE: Primary | ICD-10-CM

## 2022-01-12 RX ORDER — HYDROCODONE BITARTRATE AND ACETAMINOPHEN 5; 325 MG/1; MG/1
1 TABLET ORAL EVERY 6 HOURS PRN
Qty: 30 TABLET | Refills: 0 | Status: SHIPPED | OUTPATIENT
Start: 2022-01-12

## 2022-01-12 NOTE — PROCEDURES
[facility] SURGICAL CENTER  Operative Note     Tramaine Koenig Location: OR   [facility] [id number] MRN [id number]   Admission Date (Not on file) Operation Date 1/12/2022   Attending Physician No att. providers found Operating Physician Bri Hawthorne making skin incisions. Left arm was elevated and wrapped with aspirin bandage for exsanguination and tourniquet was elevated to 250 mmHg. Posterior incision was made curving radially around the olecranon and down the subcutaneous border of the ulna.  Full-t

## 2022-01-13 ENCOUNTER — HOSPITAL ENCOUNTER (OUTPATIENT)
Age: 67
Discharge: HOME | End: 2022-01-13
Payer: MEDICARE

## 2022-01-13 VITALS
HEART RATE: 91 BPM | SYSTOLIC BLOOD PRESSURE: 156 MMHG | OXYGEN SATURATION: 99 % | DIASTOLIC BLOOD PRESSURE: 74 MMHG | TEMPERATURE: 98 F | RESPIRATION RATE: 18 BRPM

## 2022-01-13 DIAGNOSIS — M79.602 PAIN OF LEFT UPPER EXTREMITY: Primary | ICD-10-CM

## 2022-01-13 DIAGNOSIS — Z98.890 HISTORY OF OPEN REDUCTION AND INTERNAL FIXATION (ORIF) PROCEDURE: ICD-10-CM

## 2022-01-13 PROCEDURE — 99213 OFFICE O/P EST LOW 20 MIN: CPT

## 2022-01-13 PROCEDURE — 99212 OFFICE O/P EST SF 10 MIN: CPT

## 2022-01-13 RX ORDER — IBUPROFEN 600 MG/1
600 TABLET ORAL ONCE
Status: COMPLETED | OUTPATIENT
Start: 2022-01-13 | End: 2022-01-13

## 2022-01-13 NOTE — ED PROVIDER NOTES
Patient Seen in: [facility]      History   Patient presents with:  Arm or Hand Injury  Pain: left arm    Stated Complaint: Broke left arm this weekend, in pain, swollen hand and wrist    Subjective:   HPI    50-year-old female presents to the Exam  Vitals and nursing note reviewed. Constitutional:       Appearance: Normal appearance. Cardiovascular:      Rate and Rhythm: Normal rate. Pulses: Normal pulses. Musculoskeletal:         General: Swelling and tenderness present.       Commen Prescribed:  Current Discharge Medication List

## 2022-01-13 NOTE — ED INITIAL ASSESSMENT (HPI)
Patient reports having a fall on 1/3. Patient had a displaced left olecranon fracture. Patient went to [facility] and had an ORIF yesterday. Patient reports having significant pain to her left arm-more at the forearm.  Patient with swelling to left h

## 2022-01-14 ENCOUNTER — TELEPHONE (OUTPATIENT)
Dept: ORTHOPEDICS CLINIC | Facility: CLINIC | Age: 67
End: 2022-01-14

## 2022-01-14 DIAGNOSIS — Z47.89 ORTHOPEDIC AFTERCARE: Primary | ICD-10-CM

## 2022-01-14 NOTE — ED QUICK NOTES
This RN reapplied the post mold, ace wraps, and sling that patient came in with. Enoc GARRISON agreed patient should be discharged with the same supplies patient was sent home from the hospital with.

## 2022-01-14 NOTE — TELEPHONE ENCOUNTER
POD2 s/p L elbow olecranon ORIF. Increased pain yesterday, seen in urgent care. Splint loosened with significant improvement in pain today. No N/T, no F/C. F/U next week as scheduled.

## 2022-01-17 ENCOUNTER — PATIENT MESSAGE (OUTPATIENT)
Dept: ORTHOPEDICS CLINIC | Facility: CLINIC | Age: 67
End: 2022-01-17

## 2022-01-17 NOTE — TELEPHONE ENCOUNTER
I discussed over the phone with patient's . Hand swelling common after this injury/surgery. Pain controlled, no distal numbness or tingling. Patient's  was reassured and they will follow-up on January 25 as scheduled.   They have any other

## 2022-01-25 ENCOUNTER — OFFICE VISIT (OUTPATIENT)
Dept: ORTHOPEDICS CLINIC | Facility: CLINIC | Age: 67
End: 2022-01-25
Payer: MEDICARE

## 2022-01-25 ENCOUNTER — HOSPITAL ENCOUNTER (OUTPATIENT)
Dept: GENERAL RADIOLOGY | Facility: HOSPITAL | Age: 67
Discharge: HOME | End: 2022-01-25
Attending: ORTHOPAEDIC SURGERY
Payer: MEDICARE

## 2022-01-25 DIAGNOSIS — Z47.89 ORTHOPEDIC AFTERCARE: Primary | ICD-10-CM

## 2022-01-25 DIAGNOSIS — Z47.89 ORTHOPEDIC AFTERCARE: ICD-10-CM

## 2022-01-25 PROCEDURE — 73070 X-RAY EXAM OF ELBOW: CPT | Performed by: ORTHOPAEDIC SURGERY

## 2022-01-25 PROCEDURE — 99024 POSTOP FOLLOW-UP VISIT: CPT | Performed by: ORTHOPAEDIC SURGERY

## 2022-01-25 NOTE — PROGRESS NOTES
NURSING INTAKE COMMENTS: Patient presents with:  Post-Op: s/p left elbow Oleoranon ORIF. Sx done on 1/12/22. Rates pain 1/10. denies any fevers/drainage. HPI: This 78year old female presents today for left elbow olecranon fracture status post ORIF. HPI  NEURO: See HPI    Physical Examination:    There were no vitals taken for this visit.   General appearance: alert and oriented, not in acute distress  Vascular: 2+ and symmetric pulses  Skin: intact and benign  Neurologic: Bilateral sensation intact to CHEST (3 VIEWS), LEFT  (CPT=71101)    Result Date: 1/8/2022  PROCEDURE: XR RIBS WITH CHEST (3 VIEWS), LEFT (CPT=71101)  COMPARISON: None. INDICATIONS: Left side lower and posterior rib pain post fall on 01/03/2022. TECHNIQUE:   Four views.    FINDINGS:

## 2022-02-11 ENCOUNTER — PATIENT MESSAGE (OUTPATIENT)
Dept: ORTHOPEDICS CLINIC | Facility: CLINIC | Age: 67
End: 2022-02-11

## 2022-02-11 NOTE — TELEPHONE ENCOUNTER
Dr. Thierno Betts please advise if it is ok for patient to start PT or if she should wait for follow up.

## 2022-02-11 NOTE — TELEPHONE ENCOUNTER
From: Reuben Childs  To: Christopher Panda MD  Sent: 2/11/2022 7:11 AM CST  Subject: Physical therapy    I am still not able to bend my arm completely, I have been trying to do stretches with it. I have an appointment to see Dr. Severo Caceres on the 22nd but should I start physical therapy before then?

## 2022-02-14 NOTE — TELEPHONE ENCOUNTER
Patient can start PT for elbow mobility exercises and strengthening after 2/22 if x-rays look good.   Please place order PT.

## 2022-02-15 ENCOUNTER — OFFICE VISIT (OUTPATIENT)
Dept: PHYSICAL THERAPY | Age: 67
End: 2022-02-15
Attending: ORTHOPAEDIC SURGERY
Payer: MEDICARE

## 2022-02-15 DIAGNOSIS — S52.022A CLOSED FRACTURE OF OLECRANON PROCESS OF LEFT ULNA, INITIAL ENCOUNTER: ICD-10-CM

## 2022-02-15 PROCEDURE — 97162 PT EVAL MOD COMPLEX 30 MIN: CPT

## 2022-02-15 PROCEDURE — 97110 THERAPEUTIC EXERCISES: CPT

## 2022-02-17 ENCOUNTER — OFFICE VISIT (OUTPATIENT)
Dept: PHYSICAL THERAPY | Age: 67
End: 2022-02-17
Attending: ORTHOPAEDIC SURGERY
Payer: MEDICARE

## 2022-02-17 PROCEDURE — 97140 MANUAL THERAPY 1/> REGIONS: CPT

## 2022-02-17 PROCEDURE — 97110 THERAPEUTIC EXERCISES: CPT

## 2022-02-17 NOTE — PROGRESS NOTES
Dx: Closed fracture of olecranon process of left ulna, initial encounter (S52.022A)  S/p left elbow olecranon ORIF on 1/12/22       Insurance   Medicare       Authorized  # visits by insurance  :  12  Expiration date  of Authorization: (90 days from eval otherwise)  Initial POC# of visits: 12       Eval date/latest PN: 2/15/22  Onset 1/12/22  Authorizing Physician: Dr. Maryanne Hernandez  Next MD visit: TBD  Fall Risk: standard         Precautions: surgical precautions         Subjective: Pt states that she did the exercises and did not have issues with swelling and pain . She did relate that she has swelling and has not been able to wear her rings. PAIN LEVEL:0/10  Assessment:   PT worked on A/AROM on L shoulder and all major joints as focus. Pt also worked on combination AROM movements. She demonstrates improved AROM on elbow as noted below. Pt also spent time doing MLD to minimize swelling as well as MLD to minimize hypertonicity and work on desensitization    Objective: ELBOW: ROM /MMT: no resisted assessment  AROM/MMT RIGHT LEFT RIGHT LEFT   flexion 155 115 5/5 3/5   Extension 0 -18 5/5 3/5   supination 90 80 5/5 3/5   pronation 90 90 5/5 3/5         Goals:   goals addressed this day as noted above  Goals: To be achieved in  - 12 visits then will reassess  1. 1. Pt will be I with HEP,its progression , demonstrating proper performance of exercises, >75% of the time to maintain progress in therapy :reports compliance  2. 2.Pt will demonstrate increased mid/low trap strength to at least 3/5-3+/5 or better for muscles graded below 5/5 to promote improved shoulder mechanics and stabilization with lifting and reaching   3. Pt will improve L  strength to >35 lbs or better for improved usage of L hand without difficulty   4. Pt will demonstrate improved elbow AROM on L to equal that of R to be able to use L arm with no compensation in grooming  5.  Pt will demonstrate improved strength on L elbow and shoulder to at least 4+/5 on mm graded below 5/5  to be able to carry objects at home and work and return to [facility] as PT   6. Pt will report improved function and be back to PLOF at least 80% back to normal or better to be able to manage at home I    Plan: cont per POC     Date: 2/17/2022  TX#: 2/12 Date:               TX#: 3/ Date:              TX#: 4/ Date:               TX#: 5/  FOTO Date:    Tx#: 6/   Theraex: Gripping exercises with green level  Wrist AAROM with OP x4 planes 2x10  Elbow AROM 2x10: all planes in supine starting from neutral then through range  Seated AROM in wrist and elbow    Supine AAROM L shoulder 2x10 all planes  SL scaption 2x10  SL ER 2x10         Manual: Scapula clocks  MFR on ventral and dorsal aspect of elbow and hand  MLD x10 mins with instructions given       Gait/NMRed:        Modalities         HEP GIVEN: written copy given unless otherwise indicated   2/17/22; reviewed HEP:on all major joints, MLD for LUE with written instructions    Charges: thereaex x 3, man mob x1       Total Timed Treatment: 65 min  Total Treatment Time: 65 min

## 2022-02-22 ENCOUNTER — HOSPITAL ENCOUNTER (OUTPATIENT)
Dept: GENERAL RADIOLOGY | Facility: HOSPITAL | Age: 67
Discharge: HOME | End: 2022-02-22
Attending: ORTHOPAEDIC SURGERY
Payer: MEDICARE

## 2022-02-22 ENCOUNTER — OFFICE VISIT (OUTPATIENT)
Dept: PHYSICAL THERAPY | Age: 67
End: 2022-02-22
Attending: ORTHOPAEDIC SURGERY
Payer: MEDICARE

## 2022-02-22 ENCOUNTER — OFFICE VISIT (OUTPATIENT)
Dept: ORTHOPEDICS CLINIC | Facility: CLINIC | Age: 67
End: 2022-02-22
Payer: MEDICARE

## 2022-02-22 VITALS — BODY MASS INDEX: 23.03 KG/M2 | WEIGHT: 122 LBS | HEIGHT: 61 IN

## 2022-02-22 DIAGNOSIS — Z47.89 ORTHOPEDIC AFTERCARE: ICD-10-CM

## 2022-02-22 DIAGNOSIS — Z47.89 ORTHOPEDIC AFTERCARE: Primary | ICD-10-CM

## 2022-02-22 PROCEDURE — 99024 POSTOP FOLLOW-UP VISIT: CPT | Performed by: ORTHOPAEDIC SURGERY

## 2022-02-22 PROCEDURE — 73070 X-RAY EXAM OF ELBOW: CPT | Performed by: ORTHOPAEDIC SURGERY

## 2022-02-22 PROCEDURE — 97140 MANUAL THERAPY 1/> REGIONS: CPT

## 2022-02-22 PROCEDURE — 97110 THERAPEUTIC EXERCISES: CPT

## 2022-02-22 NOTE — PROGRESS NOTES
Dx: Closed fracture of olecranon process of left ulna, initial encounter (S52.022A)  S/p left elbow olecranon ORIF on 1/12/22       Insurance   Medicare       Authorized  # visits by insurance  :  12  Expiration date  of Authorization: (90 days from eval otherwise)  Initial POC# of visits: 12       Eval date/latest PN: 2/15/22  Onset 1/12/22  Authorizing Physician: Dr. Gary Moses  Next MD visit: TBD  Fall Risk: standard         Precautions: surgical precautions         Subjective:PT states that she is doing fine. She denied pain after last session. More motions noted on elbow and shoulder is fine  PAIN LEVEL:0/10  Assessment: Reviewed MLD and pt returned demo well. PT focused MFR and NEAL On elbow today. Improved AROM noted for sagittal plane  Objective: ELBOW: ROM /MMT: no resisted assessment  AROM/MMT RIGHT LEFT RIGHT LEFT   flexion 155 121 5/5 3/5   Extension 0 -15 5/5 3/5   supination 90 80 5/5 3/5   pronation 90 90 5/5 3/5         Goals:   goals addressed this day as noted above  Goals: To be achieved in  - 12 visits then will reassess  1. 1. Pt will be I with HEP,its progression , demonstrating proper performance of exercises, >75% of the time to maintain progress in therapy :reports compliance  2. 2.Pt will demonstrate increased mid/low trap strength to at least 3/5-3+/5 or better for muscles graded below 5/5 to promote improved shoulder mechanics and stabilization with lifting and reaching   3. Pt will improve L  strength to >35 lbs or better for improved usage of L hand without difficulty   4. Pt will demonstrate improved elbow AROM on L to equal that of R to be able to use L arm with no compensation in grooming  5. Pt will demonstrate improved strength on L elbow and shoulder to at least 4+/5 on mm graded below 5/5  to be able to carry objects at home and work and return to [facility] as PT   6.  Pt will report improved function and be back to PLOF at least 80% back to normal or better to be able to manage at home I    Plan: cont per POC     Date: 2/17/2022  TX#: 2/12 Date:             2/22/22  TX#: 3/12 Date:              TX#: 4/ Date:               TX#: 5/  FOTO Date:    Tx#: 6/   Theraex: Gripping exercises with green level  Wrist AAROM with OP x4 planes 2x10  Elbow AROM 2x10: all planes in supine starting from neutral then through range  Seated AROM in wrist and elbow    Supine AAROM L shoulder 2x10 all planes  SL scaption 2x10  SL ER 2x10   overhead shoulder abd 2x10 with ball  Scapula retraction/protraction with ball 2x10  Standing elbow flexion from midrange then extension then full range 2x10  Standing supination at 90 elbow flexion  Seated supination combo flexion 2x10  Supine elbow flexion extension:midrange then full AROM 2x10 each, emphasis on low load: hold then go towards end range  Supine supination  Supine elbow flexion/extension at 90 shoulder flexed   Seated AROM flex/ext with hold        Manual: Scapula clocks  MFR on ventral and dorsal aspect of elbow and hand  MLD x10 mins with instructions given MLD review  MFR on forearm in seated and then prone for dorsal forearem then ;lateral elbow are with elbow extension  x10      Gait/NMRed:        Modalities         HEP GIVEN: written copy given unless otherwise indicated   2/17/22; reviewed HEP:on all major joints, MLD for LUE with written instructions  2/22/22: hold longer with end range  Charges: delfinox x 2, man mob x1       Total Timed Treatment: 45 min  Total Treatment Time: 55 min

## 2022-02-24 ENCOUNTER — OFFICE VISIT (OUTPATIENT)
Dept: PHYSICAL THERAPY | Age: 67
End: 2022-02-24
Attending: ORTHOPAEDIC SURGERY
Payer: MEDICARE

## 2022-02-24 PROCEDURE — 97140 MANUAL THERAPY 1/> REGIONS: CPT

## 2022-02-24 PROCEDURE — 97110 THERAPEUTIC EXERCISES: CPT

## 2022-02-24 NOTE — PROGRESS NOTES
Dx: Closed fracture of olecranon process of left ulna, initial encounter (S52.022A)  S/p left elbow olecranon ORIF on 1/12/22       Insurance   Medicare       Authorized  # visits by insurance  :  12  Expiration date  of Authorization: (90 days from eval otherwise)  Initial POC# of visits: 12       Eval date/latest PN: 2/15/22  Onset 1/12/22  Authorizing Physician: Dr. Michelle Prasad  Next MD visit: TBD  Fall Risk: standard         Precautions: surgical precautions         Subjective:Pt states that she went to see MD and was told she was doing well. SHe lost her copy of exercises and asked for another copy. She has been doing MLD and does better with coloration and swelling. She was told that she could have an option of taking hardware out if continues to do well    PAIN LEVEL:0/10  Assessment: Reviewed MLD and pt returned demo well. PT focused MFR and NEAL On elbow today. Improved AROM noted for sagittal plane  Objective: not updated this day    ELBOW: ROM /MMT:   AROM/MMT RIGHT LEFT RIGHT LEFT   flexion 155 121 5/5 3/5   Extension 0 -15 5/5 3/5   supination 90 80 5/5 3/5   pronation 90 90 5/5 3/5         Goals:   goals addressed this day as noted above  Goals: To be achieved in  - 12 visits then will reassess  1. 1. Pt will be I with HEP,its progression , demonstrating proper performance of exercises, >75% of the time to maintain progress in therapy :reports compliance  2. 2.Pt will demonstrate increased mid/low trap strength to at least 3/5-3+/5 or better for muscles graded below 5/5 to promote improved shoulder mechanics and stabilization with lifting and reaching   3. Pt will improve L  strength to >35 lbs or better for improved usage of L hand without difficulty   4. Pt will demonstrate improved elbow AROM on L to equal that of R to be able to use L arm with no compensation in grooming  5.  Pt will demonstrate improved strength on L elbow and shoulder to at least 4+/5 on mm graded below 5/5  to be able to carry objects at home and work and return to [facility] as PT   6. Pt will report improved function and be back to PLOF at least 80% back to normal or better to be able to manage at home I    Plan: cont per POC     Date: 2/17/2022  TX#: 2/12 Date:             2/22/22  TX#: 3/12 Date:        2/24/22      TX#: 4/12 Date:               TX#: 5/  FOTO Date:    Tx#: 6/   Theraex: Gripping exercises with green level  Wrist AAROM with OP x4 planes 2x10  Elbow AROM 2x10: all planes in supine starting from neutral then through range  Seated AROM in wrist and elbow    Supine AAROM L shoulder 2x10 all planes  SL scaption 2x10  SL ER 2x10   overhead shoulder abd 2x10 with ball  Scapula retraction/protraction with ball 2x10  Standing elbow flexion from midrange then extension then full range 2x10  Standing supination at 90 elbow flexion  Seated supination combo flexion 2x10  Supine elbow flexion extension:midrange then full AROM 2x10 each, emphasis on low load: hold then go towards end range  Supine supination  Supine elbow flexion/extension at 90 shoulder flexed   Seated AROM flex/ext with hold   NU step level 2x6 mins: UE and LE  Standing shoulder AROM in all planes in standing  2x10 in cluding extension and IR behind back  Standing elbow flexion from midrange then extension then full range 2x10  Standing supination at 90 elbow flexion    Scapula retractions: narrow and wide  YTB 2x10  Seated supination combo flexion 2x10  Supine supination  Supine elbow flexion/extension at 90 shoulder flexed  Supine elbow flexion/extension 2x10     Manual: Scapula clocks  MFR on ventral and dorsal aspect of elbow and hand  MLD x10 mins with instructions given MLD review  MFR on forearm in seated and then prone for dorsal forearem then ;lateral elbow are with elbow extension  x10 MFR on forearm ventral and dorsal aspect in isolation and with movements  Worked on triceps area to assist with increased flexiom     Gait/NMRed:        Modalities HEP GIVEN: written copy given unless otherwise indicated   2/17/22; reviewed HEP:on all major joints, MLD for LUE with written instructions  2/22/22: hold longer with end range  2/24/22: given another copy of ex, standing AROM in all planes and scapula retraction:narrow and wide  Charges: thereaex x 2, man mob x1       Total Timed Treatment: 45 min  Total Treatment Time: 55 min

## 2022-03-02 ENCOUNTER — OFFICE VISIT (OUTPATIENT)
Dept: PHYSICAL THERAPY | Age: 67
End: 2022-03-02
Attending: ORTHOPAEDIC SURGERY
Payer: MEDICARE

## 2022-03-02 PROCEDURE — 97110 THERAPEUTIC EXERCISES: CPT

## 2022-03-02 PROCEDURE — 97140 MANUAL THERAPY 1/> REGIONS: CPT

## 2022-03-04 ENCOUNTER — OFFICE VISIT (OUTPATIENT)
Dept: PHYSICAL THERAPY | Age: 67
End: 2022-03-04
Attending: ORTHOPAEDIC SURGERY
Payer: MEDICARE

## 2022-03-04 PROCEDURE — 97140 MANUAL THERAPY 1/> REGIONS: CPT

## 2022-03-04 PROCEDURE — 97110 THERAPEUTIC EXERCISES: CPT

## 2022-03-08 ENCOUNTER — OFFICE VISIT (OUTPATIENT)
Dept: PHYSICAL THERAPY | Age: 67
End: 2022-03-08
Attending: ORTHOPAEDIC SURGERY
Payer: MEDICARE

## 2022-03-08 PROCEDURE — 97110 THERAPEUTIC EXERCISES: CPT

## 2022-03-08 PROCEDURE — 97140 MANUAL THERAPY 1/> REGIONS: CPT

## 2022-03-10 ENCOUNTER — OFFICE VISIT (OUTPATIENT)
Dept: PHYSICAL THERAPY | Age: 67
End: 2022-03-10
Attending: ORTHOPAEDIC SURGERY
Payer: MEDICARE

## 2022-03-10 PROCEDURE — 97110 THERAPEUTIC EXERCISES: CPT

## 2022-03-10 PROCEDURE — 97140 MANUAL THERAPY 1/> REGIONS: CPT

## 2022-03-15 ENCOUNTER — OFFICE VISIT (OUTPATIENT)
Dept: PHYSICAL THERAPY | Age: 67
End: 2022-03-15
Attending: ORTHOPAEDIC SURGERY
Payer: MEDICARE

## 2022-03-15 PROCEDURE — 97110 THERAPEUTIC EXERCISES: CPT

## 2022-03-15 PROCEDURE — 97140 MANUAL THERAPY 1/> REGIONS: CPT

## 2022-03-17 ENCOUNTER — OFFICE VISIT (OUTPATIENT)
Dept: PHYSICAL THERAPY | Age: 67
End: 2022-03-17
Attending: ORTHOPAEDIC SURGERY
Payer: MEDICARE

## 2022-03-17 PROCEDURE — 97140 MANUAL THERAPY 1/> REGIONS: CPT

## 2022-03-17 PROCEDURE — 97110 THERAPEUTIC EXERCISES: CPT

## 2022-03-22 ENCOUNTER — OFFICE VISIT (OUTPATIENT)
Dept: PHYSICAL THERAPY | Age: 67
End: 2022-03-22
Attending: ORTHOPAEDIC SURGERY
Payer: MEDICARE

## 2022-03-22 PROCEDURE — 97110 THERAPEUTIC EXERCISES: CPT

## 2022-03-22 PROCEDURE — 97140 MANUAL THERAPY 1/> REGIONS: CPT

## 2022-03-24 ENCOUNTER — OFFICE VISIT (OUTPATIENT)
Dept: PHYSICAL THERAPY | Age: 67
End: 2022-03-24
Attending: ORTHOPAEDIC SURGERY
Payer: MEDICARE

## 2022-03-24 PROCEDURE — 97110 THERAPEUTIC EXERCISES: CPT

## 2022-04-05 ENCOUNTER — OFFICE VISIT (OUTPATIENT)
Dept: ORTHOPEDICS CLINIC | Facility: CLINIC | Age: 67
End: 2022-04-05
Payer: MEDICARE

## 2022-04-05 ENCOUNTER — HOSPITAL ENCOUNTER (OUTPATIENT)
Dept: GENERAL RADIOLOGY | Facility: HOSPITAL | Age: 67
Discharge: HOME | End: 2022-04-05
Attending: ORTHOPAEDIC SURGERY
Payer: MEDICARE

## 2022-04-05 DIAGNOSIS — Z47.89 ORTHOPEDIC AFTERCARE: Primary | ICD-10-CM

## 2022-04-05 DIAGNOSIS — Z47.89 ORTHOPEDIC AFTERCARE: ICD-10-CM

## 2022-04-05 PROCEDURE — 99024 POSTOP FOLLOW-UP VISIT: CPT | Performed by: ORTHOPAEDIC SURGERY

## 2022-04-05 PROCEDURE — 73070 X-RAY EXAM OF ELBOW: CPT | Performed by: ORTHOPAEDIC SURGERY

## 2022-07-18 ENCOUNTER — OFFICE VISIT (OUTPATIENT)
Dept: FAMILY MEDICINE CLINIC | Facility: CLINIC | Age: 67
End: 2022-07-18
Payer: MEDICARE

## 2022-07-18 ENCOUNTER — LAB ENCOUNTER (OUTPATIENT)
Dept: LAB | Age: 67
End: 2022-07-18
Attending: FAMILY MEDICINE
Payer: MEDICARE

## 2022-07-18 VITALS
SYSTOLIC BLOOD PRESSURE: 127 MMHG | TEMPERATURE: 98 F | WEIGHT: 116.63 LBS | HEART RATE: 99 BPM | DIASTOLIC BLOOD PRESSURE: 69 MMHG | BODY MASS INDEX: 22.02 KG/M2 | HEIGHT: 61 IN

## 2022-07-18 DIAGNOSIS — R19.5 CHANGE IN STOOL CALIBER: ICD-10-CM

## 2022-07-18 DIAGNOSIS — L30.9 DERMATITIS: ICD-10-CM

## 2022-07-18 DIAGNOSIS — R19.4 INCREASED BOWEL FREQUENCY: ICD-10-CM

## 2022-07-18 DIAGNOSIS — Z78.9 DAILY CONSUMPTION OF ALCOHOL: ICD-10-CM

## 2022-07-18 DIAGNOSIS — K14.0 GLOSSITIS: Primary | ICD-10-CM

## 2022-07-18 LAB
ALBUMIN SERPL-MCNC: 3.9 G/DL (ref 3.4–5)
ALBUMIN/GLOB SERPL: 1.1 {RATIO} (ref 1–2)
ALP LIVER SERPL-CCNC: 86 U/L
ALT SERPL-CCNC: 22 U/L
ANION GAP SERPL CALC-SCNC: 9 MMOL/L (ref 0–18)
AST SERPL-CCNC: 24 U/L (ref 15–37)
BASOPHILS # BLD AUTO: 0.05 X10(3) UL (ref 0–0.2)
BASOPHILS NFR BLD AUTO: 1 %
BILIRUB SERPL-MCNC: 1.3 MG/DL (ref 0.1–2)
BUN BLD-MCNC: 12 MG/DL (ref 7–18)
BUN/CREAT SERPL: 10.7 (ref 10–20)
CALCIUM BLD-MCNC: 9.2 MG/DL (ref 8.5–10.1)
CHLORIDE SERPL-SCNC: 103 MMOL/L (ref 98–112)
CO2 SERPL-SCNC: 27 MMOL/L (ref 21–32)
CREAT BLD-MCNC: 1.12 MG/DL
CRP SERPL-MCNC: <0.29 MG/DL (ref ?–0.3)
DEPRECATED RDW RBC AUTO: 42.8 FL (ref 35.1–46.3)
EOSINOPHIL # BLD AUTO: 0.03 X10(3) UL (ref 0–0.7)
EOSINOPHIL NFR BLD AUTO: 0.6 %
ERYTHROCYTE [DISTWIDTH] IN BLOOD BY AUTOMATED COUNT: 12 % (ref 11–15)
ERYTHROCYTE [SEDIMENTATION RATE] IN BLOOD: 13 MM/HR
FASTING STATUS PATIENT QL REPORTED: NO
GLOBULIN PLAS-MCNC: 3.7 G/DL (ref 2.8–4.4)
GLUCOSE BLD-MCNC: 98 MG/DL (ref 70–99)
HCT VFR BLD AUTO: 38.8 %
HGB BLD-MCNC: 13 G/DL
IMM GRANULOCYTES # BLD AUTO: 0.01 X10(3) UL (ref 0–1)
IMM GRANULOCYTES NFR BLD: 0.2 %
LYMPHOCYTES # BLD AUTO: 0.97 X10(3) UL (ref 1–4)
LYMPHOCYTES NFR BLD AUTO: 19.8 %
MCH RBC QN AUTO: 32.5 PG (ref 26–34)
MCHC RBC AUTO-ENTMCNC: 33.5 G/DL (ref 31–37)
MCV RBC AUTO: 97 FL
MONOCYTES # BLD AUTO: 0.53 X10(3) UL (ref 0.1–1)
MONOCYTES NFR BLD AUTO: 10.8 %
NEUTROPHILS # BLD AUTO: 3.32 X10 (3) UL (ref 1.5–7.7)
NEUTROPHILS # BLD AUTO: 3.32 X10(3) UL (ref 1.5–7.7)
NEUTROPHILS NFR BLD AUTO: 67.6 %
OSMOLALITY SERPL CALC.SUM OF ELEC: 288 MOSM/KG (ref 275–295)
PLATELET # BLD AUTO: 228 10(3)UL (ref 150–450)
POTASSIUM SERPL-SCNC: 4 MMOL/L (ref 3.5–5.1)
PROT SERPL-MCNC: 7.6 G/DL (ref 6.4–8.2)
RBC # BLD AUTO: 4 X10(6)UL
SODIUM SERPL-SCNC: 139 MMOL/L (ref 136–145)
TSI SER-ACNC: 2.13 MIU/ML (ref 0.36–3.74)
WBC # BLD AUTO: 4.9 X10(3) UL (ref 4–11)

## 2022-07-18 PROCEDURE — 85025 COMPLETE CBC W/AUTO DIFF WBC: CPT

## 2022-07-18 PROCEDURE — 80053 COMPREHEN METABOLIC PANEL: CPT

## 2022-07-18 PROCEDURE — 86140 C-REACTIVE PROTEIN: CPT

## 2022-07-18 PROCEDURE — 85652 RBC SED RATE AUTOMATED: CPT

## 2022-07-18 PROCEDURE — 84443 ASSAY THYROID STIM HORMONE: CPT

## 2022-07-18 PROCEDURE — 36415 COLL VENOUS BLD VENIPUNCTURE: CPT

## 2022-07-18 PROCEDURE — 1126F AMNT PAIN NOTED NONE PRSNT: CPT | Performed by: FAMILY MEDICINE

## 2022-07-18 PROCEDURE — 99214 OFFICE O/P EST MOD 30 MIN: CPT | Performed by: FAMILY MEDICINE

## 2022-07-18 RX ORDER — TRIAMCINOLONE ACETONIDE 1 MG/G
CREAM TOPICAL
COMMUNITY
Start: 2022-05-13

## 2022-07-25 ENCOUNTER — LAB ENCOUNTER (OUTPATIENT)
Dept: LAB | Age: 67
End: 2022-07-25
Attending: FAMILY MEDICINE
Payer: MEDICARE

## 2022-07-25 DIAGNOSIS — N28.9 RENAL INSUFFICIENCY: ICD-10-CM

## 2022-07-25 LAB
ANION GAP SERPL CALC-SCNC: 8 MMOL/L (ref 0–18)
BUN BLD-MCNC: 12 MG/DL (ref 7–18)
BUN/CREAT SERPL: 14.6 (ref 10–20)
CALCIUM BLD-MCNC: 9.1 MG/DL (ref 8.5–10.1)
CHLORIDE SERPL-SCNC: 106 MMOL/L (ref 98–112)
CO2 SERPL-SCNC: 25 MMOL/L (ref 21–32)
CREAT BLD-MCNC: 0.82 MG/DL
FASTING STATUS PATIENT QL REPORTED: YES
GLUCOSE BLD-MCNC: 93 MG/DL (ref 70–99)
OSMOLALITY SERPL CALC.SUM OF ELEC: 287 MOSM/KG (ref 275–295)
POTASSIUM SERPL-SCNC: 4.2 MMOL/L (ref 3.5–5.1)
SODIUM SERPL-SCNC: 139 MMOL/L (ref 136–145)

## 2022-07-25 PROCEDURE — 36415 COLL VENOUS BLD VENIPUNCTURE: CPT

## 2022-07-25 PROCEDURE — 80048 BASIC METABOLIC PNL TOTAL CA: CPT

## 2022-08-03 ENCOUNTER — OFFICE VISIT (OUTPATIENT)
Dept: FAMILY MEDICINE CLINIC | Facility: CLINIC | Age: 67
End: 2022-08-03
Payer: MEDICARE

## 2022-08-03 VITALS
HEIGHT: 61 IN | TEMPERATURE: 98 F | BODY MASS INDEX: 22.78 KG/M2 | SYSTOLIC BLOOD PRESSURE: 132 MMHG | WEIGHT: 120.63 LBS | HEART RATE: 80 BPM | DIASTOLIC BLOOD PRESSURE: 76 MMHG

## 2022-08-03 DIAGNOSIS — R10.31 RIGHT LOWER QUADRANT ABDOMINAL PAIN: ICD-10-CM

## 2022-08-03 DIAGNOSIS — K59.00 CONSTIPATION, UNSPECIFIED CONSTIPATION TYPE: Primary | ICD-10-CM

## 2022-08-03 LAB
APPEARANCE: CLEAR
BILIRUBIN: NEGATIVE
GLUCOSE (URINE DIPSTICK): NEGATIVE MG/DL
KETONES (URINE DIPSTICK): NEGATIVE MG/DL
LEUKOCYTES: NEGATIVE
MULTISTIX LOT#: ABNORMAL NUMERIC
NITRITE, URINE: NEGATIVE
PH, URINE: 5.5 (ref 4.5–8)
PROTEIN (URINE DIPSTICK): NEGATIVE MG/DL
SPECIFIC GRAVITY: 1.01 (ref 1–1.03)
URINE-COLOR: YELLOW
UROBILINOGEN,SEMI-QN: 0.2 MG/DL (ref 0–1.9)

## 2022-08-03 PROCEDURE — 1125F AMNT PAIN NOTED PAIN PRSNT: CPT | Performed by: FAMILY MEDICINE

## 2022-08-03 PROCEDURE — 81003 URINALYSIS AUTO W/O SCOPE: CPT | Performed by: FAMILY MEDICINE

## 2022-08-03 PROCEDURE — 99214 OFFICE O/P EST MOD 30 MIN: CPT | Performed by: FAMILY MEDICINE

## 2022-10-20 ENCOUNTER — NURSE TRIAGE (OUTPATIENT)
Dept: FAMILY MEDICINE CLINIC | Facility: CLINIC | Age: 67
End: 2022-10-20

## 2022-10-21 ENCOUNTER — TELEMEDICINE (OUTPATIENT)
Dept: FAMILY MEDICINE CLINIC | Facility: CLINIC | Age: 67
End: 2022-10-21

## 2022-10-21 DIAGNOSIS — K52.9 CHRONIC DIARRHEA: ICD-10-CM

## 2022-10-21 DIAGNOSIS — R19.7 DIARRHEA, UNSPECIFIED TYPE: Primary | ICD-10-CM

## 2022-10-21 DIAGNOSIS — K64.4 EXTERNAL HEMORRHOID: ICD-10-CM

## 2022-10-21 PROCEDURE — 99214 OFFICE O/P EST MOD 30 MIN: CPT | Performed by: FAMILY MEDICINE

## 2022-10-25 ENCOUNTER — LAB ENCOUNTER (OUTPATIENT)
Dept: LAB | Age: 67
End: 2022-10-25
Attending: FAMILY MEDICINE
Payer: MEDICARE

## 2022-10-25 DIAGNOSIS — R19.7 DIARRHEA, UNSPECIFIED TYPE: ICD-10-CM

## 2022-10-27 ENCOUNTER — OFFICE VISIT (OUTPATIENT)
Dept: FAMILY MEDICINE CLINIC | Facility: CLINIC | Age: 67
End: 2022-10-27
Payer: MEDICARE

## 2022-10-27 ENCOUNTER — LAB ENCOUNTER (OUTPATIENT)
Dept: LAB | Age: 67
End: 2022-10-27
Attending: FAMILY MEDICINE
Payer: MEDICARE

## 2022-10-27 VITALS
HEIGHT: 61 IN | TEMPERATURE: 98 F | BODY MASS INDEX: 23.07 KG/M2 | DIASTOLIC BLOOD PRESSURE: 77 MMHG | SYSTOLIC BLOOD PRESSURE: 136 MMHG | HEART RATE: 92 BPM | WEIGHT: 122.19 LBS

## 2022-10-27 DIAGNOSIS — Z00.00 ENCOUNTER FOR ANNUAL HEALTH EXAMINATION: ICD-10-CM

## 2022-10-27 DIAGNOSIS — R41.3 MEMORY LOSS: ICD-10-CM

## 2022-10-27 DIAGNOSIS — Z23 INFLUENZA VACCINE NEEDED: ICD-10-CM

## 2022-10-27 DIAGNOSIS — R19.5 CHANGE IN STOOL CALIBER: ICD-10-CM

## 2022-10-27 DIAGNOSIS — Z23 NEED FOR VACCINATION: ICD-10-CM

## 2022-10-27 DIAGNOSIS — Z12.31 VISIT FOR SCREENING MAMMOGRAM: ICD-10-CM

## 2022-10-27 DIAGNOSIS — R68.89 FORGETFULNESS: ICD-10-CM

## 2022-10-27 DIAGNOSIS — R19.4 CHANGE IN BOWEL HABIT: ICD-10-CM

## 2022-10-27 DIAGNOSIS — Z00.00 ADULT GENERAL MEDICAL EXAM: Primary | ICD-10-CM

## 2022-10-27 DIAGNOSIS — Z13.220 SCREENING, LIPID: ICD-10-CM

## 2022-10-27 LAB — C DIFF TOX B STL QL: NEGATIVE

## 2022-10-27 PROCEDURE — 87493 C DIFF AMPLIFIED PROBE: CPT

## 2022-10-27 RX ORDER — ESTRADIOL 0.1 MG/G
CREAM VAGINAL
COMMUNITY
Start: 2022-09-08

## 2022-11-07 ENCOUNTER — LAB ENCOUNTER (OUTPATIENT)
Dept: LAB | Age: 67
End: 2022-11-07
Attending: FAMILY MEDICINE
Payer: MEDICARE

## 2022-11-07 DIAGNOSIS — R41.3 MEMORY LOSS: ICD-10-CM

## 2022-11-07 DIAGNOSIS — R19.4 CHANGE IN BOWEL HABIT: ICD-10-CM

## 2022-11-07 DIAGNOSIS — R19.5 CHANGE IN STOOL CALIBER: ICD-10-CM

## 2022-11-07 DIAGNOSIS — R68.89 FORGETFULNESS: ICD-10-CM

## 2022-11-07 DIAGNOSIS — Z13.220 SCREENING, LIPID: ICD-10-CM

## 2022-11-07 LAB
ALBUMIN SERPL-MCNC: 3.8 G/DL (ref 3.4–5)
ALBUMIN/GLOB SERPL: 1.1 {RATIO} (ref 1–2)
ALP LIVER SERPL-CCNC: 97 U/L
ALT SERPL-CCNC: 21 U/L
ANION GAP SERPL CALC-SCNC: 8 MMOL/L (ref 0–18)
AST SERPL-CCNC: 22 U/L (ref 15–37)
BASOPHILS # BLD AUTO: 0.06 X10(3) UL (ref 0–0.2)
BASOPHILS NFR BLD AUTO: 1.4 %
BILIRUB SERPL-MCNC: 1.1 MG/DL (ref 0.1–2)
BUN BLD-MCNC: 13 MG/DL (ref 7–18)
BUN/CREAT SERPL: 14.9 (ref 10–20)
CALCIUM BLD-MCNC: 9 MG/DL (ref 8.5–10.1)
CHLORIDE SERPL-SCNC: 104 MMOL/L (ref 98–112)
CHOLEST SERPL-MCNC: 254 MG/DL (ref ?–200)
CO2 SERPL-SCNC: 28 MMOL/L (ref 21–32)
CREAT BLD-MCNC: 0.87 MG/DL
DEPRECATED RDW RBC AUTO: 45 FL (ref 35.1–46.3)
EOSINOPHIL # BLD AUTO: 0.1 X10(3) UL (ref 0–0.7)
EOSINOPHIL NFR BLD AUTO: 2.4 %
ERYTHROCYTE [DISTWIDTH] IN BLOOD BY AUTOMATED COUNT: 12.3 % (ref 11–15)
FASTING PATIENT LIPID ANSWER: YES
FASTING STATUS PATIENT QL REPORTED: YES
FOLATE SERPL-MCNC: 19 NG/ML (ref 8.7–?)
GFR SERPLBLD BASED ON 1.73 SQ M-ARVRAT: 73 ML/MIN/1.73M2 (ref 60–?)
GLOBULIN PLAS-MCNC: 3.4 G/DL (ref 2.8–4.4)
GLUCOSE BLD-MCNC: 101 MG/DL (ref 70–99)
HCT VFR BLD AUTO: 39.8 %
HDLC SERPL-MCNC: 79 MG/DL (ref 40–59)
HGB BLD-MCNC: 13.7 G/DL
IMM GRANULOCYTES # BLD AUTO: 0.01 X10(3) UL (ref 0–1)
IMM GRANULOCYTES NFR BLD: 0.2 %
LDLC SERPL CALC-MCNC: 160 MG/DL (ref ?–100)
LYMPHOCYTES # BLD AUTO: 1 X10(3) UL (ref 1–4)
LYMPHOCYTES NFR BLD AUTO: 24 %
MCH RBC QN AUTO: 33.7 PG (ref 26–34)
MCHC RBC AUTO-ENTMCNC: 34.4 G/DL (ref 31–37)
MCV RBC AUTO: 98 FL
MONOCYTES # BLD AUTO: 0.58 X10(3) UL (ref 0.1–1)
MONOCYTES NFR BLD AUTO: 13.9 %
NEUTROPHILS # BLD AUTO: 2.42 X10 (3) UL (ref 1.5–7.7)
NEUTROPHILS # BLD AUTO: 2.42 X10(3) UL (ref 1.5–7.7)
NEUTROPHILS NFR BLD AUTO: 58.1 %
NONHDLC SERPL-MCNC: 175 MG/DL (ref ?–130)
OSMOLALITY SERPL CALC.SUM OF ELEC: 290 MOSM/KG (ref 275–295)
PLATELET # BLD AUTO: 246 10(3)UL (ref 150–450)
POTASSIUM SERPL-SCNC: 4.7 MMOL/L (ref 3.5–5.1)
PROT SERPL-MCNC: 7.2 G/DL (ref 6.4–8.2)
RBC # BLD AUTO: 4.06 X10(6)UL
SODIUM SERPL-SCNC: 140 MMOL/L (ref 136–145)
TRIGL SERPL-MCNC: 89 MG/DL (ref 30–149)
TSI SER-ACNC: 2.33 MIU/ML (ref 0.36–3.74)
VIT B12 SERPL-MCNC: 382 PG/ML (ref 193–986)
VLDLC SERPL CALC-MCNC: 17 MG/DL (ref 0–30)
WBC # BLD AUTO: 4.2 X10(3) UL (ref 4–11)

## 2022-11-07 PROCEDURE — 82607 VITAMIN B-12: CPT

## 2022-11-07 PROCEDURE — 84443 ASSAY THYROID STIM HORMONE: CPT

## 2022-11-07 PROCEDURE — 80061 LIPID PANEL: CPT

## 2022-11-07 PROCEDURE — 82746 ASSAY OF FOLIC ACID SERUM: CPT

## 2022-11-07 PROCEDURE — 80053 COMPREHEN METABOLIC PANEL: CPT

## 2022-11-07 PROCEDURE — 36415 COLL VENOUS BLD VENIPUNCTURE: CPT

## 2022-11-07 PROCEDURE — 85025 COMPLETE CBC W/AUTO DIFF WBC: CPT

## 2022-11-21 ENCOUNTER — OFFICE VISIT (OUTPATIENT)
Dept: INTERNAL MEDICINE CLINIC | Facility: CLINIC | Age: 67
End: 2022-11-21
Payer: MEDICARE

## 2022-11-21 VITALS
DIASTOLIC BLOOD PRESSURE: 78 MMHG | HEIGHT: 61 IN | HEART RATE: 80 BPM | BODY MASS INDEX: 22.66 KG/M2 | WEIGHT: 120 LBS | SYSTOLIC BLOOD PRESSURE: 136 MMHG

## 2022-11-21 DIAGNOSIS — R19.5 CHANGE IN STOOL: Primary | ICD-10-CM

## 2022-11-21 DIAGNOSIS — Z78.0 POSTMENOPAUSAL: ICD-10-CM

## 2022-11-21 DIAGNOSIS — R41.3 MEMORY DISORDER: ICD-10-CM

## 2022-11-21 PROCEDURE — 99214 OFFICE O/P EST MOD 30 MIN: CPT | Performed by: INTERNAL MEDICINE

## 2022-11-21 PROCEDURE — 1126F AMNT PAIN NOTED NONE PRSNT: CPT | Performed by: INTERNAL MEDICINE

## 2023-01-04 ENCOUNTER — HOSPITAL ENCOUNTER (OUTPATIENT)
Dept: BONE DENSITY | Age: 68
Discharge: HOME | End: 2023-01-04
Attending: INTERNAL MEDICINE
Payer: MEDICARE

## 2023-01-04 DIAGNOSIS — Z78.0 POSTMENOPAUSAL: ICD-10-CM

## 2023-01-04 PROCEDURE — 77080 DXA BONE DENSITY AXIAL: CPT | Performed by: INTERNAL MEDICINE

## 2023-01-30 ENCOUNTER — OFFICE VISIT (OUTPATIENT)
Dept: FAMILY MEDICINE CLINIC | Facility: CLINIC | Age: 68
End: 2023-01-30

## 2023-01-30 VITALS
WEIGHT: 125 LBS | BODY MASS INDEX: 23.6 KG/M2 | SYSTOLIC BLOOD PRESSURE: 130 MMHG | HEIGHT: 61 IN | TEMPERATURE: 97 F | HEART RATE: 93 BPM | DIASTOLIC BLOOD PRESSURE: 70 MMHG

## 2023-01-30 DIAGNOSIS — M81.0 AGE-RELATED OSTEOPOROSIS WITHOUT CURRENT PATHOLOGICAL FRACTURE: Primary | ICD-10-CM

## 2023-01-30 DIAGNOSIS — R41.3 MEMORY LOSS: ICD-10-CM

## 2023-01-30 PROCEDURE — 99213 OFFICE O/P EST LOW 20 MIN: CPT | Performed by: FAMILY MEDICINE

## 2023-01-30 RX ORDER — ESCITALOPRAM OXALATE 5 MG/1
5 TABLET ORAL
COMMUNITY
Start: 2022-12-29

## 2023-01-30 RX ORDER — IBANDRONATE SODIUM 150 MG/1
150 TABLET, FILM COATED ORAL
Qty: 3 TABLET | Refills: 3 | Status: SHIPPED | OUTPATIENT
Start: 2023-01-30

## 2023-01-31 ENCOUNTER — HOSPITAL ENCOUNTER (OUTPATIENT)
Dept: CT IMAGING | Age: 68
Discharge: HOME | End: 2023-01-31
Attending: FAMILY MEDICINE

## 2023-01-31 DIAGNOSIS — Z13.6 SCREENING FOR HEART DISEASE: ICD-10-CM

## 2023-01-31 NOTE — PROGRESS NOTES
Date of Service 1/31/2023    Latrell Carvajal  Date of Birth 5/8/1955    Patient Age: 75year old    PCP: Jose Alberto Jade DO  [address]  [facility] [address]    Consult Type  Type Scan/Screening: Heart Scan  Preliminary Heart Scan Score: 0                Body Mass Index  There is no height or weight on file to calculate BMI. Lipid Profile  Cholesterol: 254, done on 11/7/2022. HDL Cholesterol: 79, done on 11/7/2022. LDL Cholesterol: 160, done on 11/7/2022. TriGlycerides 89, done on 11/7/2022. We discussed LDL goal being less than 100. Decreasing Saturated fats and increasing fiber to try to help her lower this. Nurse Review  Risk factor information and results reviewed with Nurse: Yes    Recommended Follow Up:  Consult your physician regarding[de-identified] Final Heart Scan Report; Discuss potential for Incidental Finding    No data recorded      Recommendations for Change:  Nutrition Changes: Low Saturated Fat; Increase Fiber  Cholesterol Modification (goal of therapy depends upon your risk): Decrease LDL (Lousy/Bad) Ideal <100  Exercise: No Change Needed  Smoking Cessation: No Change Needed     Stress Management: Adopt Stress Management Techniques  Repeat Heart Scan: 5 years if Calcium Score is 0. 0; Discuss with your Physician          Teo Recommended Resources:  Recommended Resources: Upcoming Classes, Medical Services and Health Library www. [email or web address]. Jason Ferguson RN        Please Contact the Nurse Heart Line with any Questions or Concerns [phone].

## 2023-02-06 ENCOUNTER — OFFICE VISIT (OUTPATIENT)
Dept: FAMILY MEDICINE CLINIC | Facility: CLINIC | Age: 68
End: 2023-02-06

## 2023-02-06 ENCOUNTER — NURSE TRIAGE (OUTPATIENT)
Dept: FAMILY MEDICINE CLINIC | Facility: CLINIC | Age: 68
End: 2023-02-06

## 2023-02-06 VITALS — WEIGHT: 126 LBS | BODY MASS INDEX: 23.79 KG/M2 | HEIGHT: 61 IN

## 2023-02-06 DIAGNOSIS — B37.31 CANDIDIASIS OF GENITALIA IN FEMALE: Primary | ICD-10-CM

## 2023-02-06 LAB — TRICHOMONAS SCREEN: NEGATIVE

## 2023-02-06 PROCEDURE — 87106 FUNGI IDENTIFICATION YEAST: CPT | Performed by: NURSE PRACTITIONER

## 2023-02-06 PROCEDURE — 87808 TRICHOMONAS ASSAY W/OPTIC: CPT | Performed by: NURSE PRACTITIONER

## 2023-02-06 PROCEDURE — 87205 SMEAR GRAM STAIN: CPT | Performed by: NURSE PRACTITIONER

## 2023-02-06 RX ORDER — CLOTRIMAZOLE 1 %
1 CREAM (GRAM) TOPICAL 2 TIMES DAILY
Qty: 40 G | Refills: 0 | Status: SHIPPED | OUTPATIENT
Start: 2023-02-06

## 2023-02-07 NOTE — ASSESSMENT & PLAN NOTE
Vaginal culture  Clotrimazole cream twice a day-use for 5 days after all erythema has resolved  Discussed skin care  Please call if symptoms worsen or are not resolving.

## 2023-02-08 LAB — TRICHOMONAS SCREEN: NEGATIVE

## 2023-02-09 ENCOUNTER — PATIENT MESSAGE (OUTPATIENT)
Dept: FAMILY MEDICINE CLINIC | Facility: CLINIC | Age: 68
End: 2023-02-09

## 2023-02-09 NOTE — TELEPHONE ENCOUNTER
Isabel Farnsworth NP=see message and advice,thanks. Bala Rodriguez RN 2/9/2023  2:09 PM CST        ----- Message -----  From: Osvaldo Riley  Sent: 2/9/2023   6:50 AM CST  To: [facility] Rn Triage  Subject: Test Result                                      My Genital vaginosis screen came back Inconclusive, Submit New Specimen if Clinically Indicated. Should I have this test redone or is this ok?

## 2023-03-02 DIAGNOSIS — B37.31 CANDIDIASIS OF GENITALIA IN FEMALE: ICD-10-CM

## 2023-03-02 RX ORDER — CLOTRIMAZOLE 1 %
CREAM (GRAM) TOPICAL
Qty: 45 G | Refills: 0 | Status: SHIPPED | OUTPATIENT
Start: 2023-03-02

## 2023-03-02 NOTE — TELEPHONE ENCOUNTER
Please review. Protocol failed / No protocol.    Requested Prescriptions   Pending Prescriptions Disp Refills    CLOTRIMAZOLE 1 % External Cream [Pharmacy Med Name: CLOTRIMAZOLE 1% CREAM 45GM] 45 g 0     Sig: APPLY TO [facility] TWICE DAILY; USE ADDITION 5 DAYS AFTER ALL SYMPTOMS RESOLVED       There is no refill protocol information for this order         Recent Outpatient Visits              3 weeks ago Candidiasis of genitalia in female    [address], [facility], [place] Jeane Bumpers, APRN    Office Visit    1 month ago Age-related osteoporosis without current pathological fracture    [facility], [facility], [address], [place],     Office Visit    3 months ago Change in stool    [facility] [place]    Office Visit    4 months ago Adult general medical exam    [facility], [address], [place],     Office Visit    4 months ago Diarrhea, unspecified type    [facility], [facility], Harshal Lee DO    Telemedicine

## 2023-04-09 ENCOUNTER — APPOINTMENT (OUTPATIENT)
Dept: GENERAL RADIOLOGY | Facility: HOSPITAL | Age: 68
End: 2023-04-09
Attending: EMERGENCY MEDICINE
Payer: MEDICARE

## 2023-04-09 ENCOUNTER — HOSPITAL ENCOUNTER (EMERGENCY)
Facility: HOSPITAL | Age: 68
Discharge: HOME | End: 2023-04-09
Attending: STUDENT IN AN ORGANIZED HEALTH CARE EDUCATION/TRAINING PROGRAM
Payer: MEDICARE

## 2023-04-09 VITALS
OXYGEN SATURATION: 96 % | HEIGHT: 62 IN | HEART RATE: 77 BPM | DIASTOLIC BLOOD PRESSURE: 62 MMHG | BODY MASS INDEX: 22.08 KG/M2 | RESPIRATION RATE: 18 BRPM | SYSTOLIC BLOOD PRESSURE: 102 MMHG | TEMPERATURE: 98 F | WEIGHT: 120 LBS

## 2023-04-09 DIAGNOSIS — S52.571A OTHER CLOSED INTRA-ARTICULAR FRACTURE OF DISTAL END OF RIGHT RADIUS, INITIAL ENCOUNTER: Primary | ICD-10-CM

## 2023-04-09 DIAGNOSIS — S52.614A CLOSED NONDISPLACED FRACTURE OF STYLOID PROCESS OF RIGHT ULNA, INITIAL ENCOUNTER: ICD-10-CM

## 2023-04-09 PROCEDURE — 99284 EMERGENCY DEPT VISIT MOD MDM: CPT

## 2023-04-09 PROCEDURE — 73110 X-RAY EXAM OF WRIST: CPT | Performed by: STUDENT IN AN ORGANIZED HEALTH CARE EDUCATION/TRAINING PROGRAM

## 2023-04-09 RX ORDER — IBUPROFEN 600 MG/1
600 TABLET ORAL ONCE
Status: COMPLETED | OUTPATIENT
Start: 2023-04-09 | End: 2023-04-09

## 2023-04-09 NOTE — ED INITIAL ASSESSMENT (HPI)
Patient ambulatory to ED with complaint of right wrist pain post mechanical fall down 2 stairs this morning. Denies head injury or anticoagulant use. Patient is AXOX4.

## 2023-04-10 ENCOUNTER — TELEPHONE (OUTPATIENT)
Dept: ORTHOPEDICS CLINIC | Facility: CLINIC | Age: 68
End: 2023-04-10

## 2023-04-10 DIAGNOSIS — M25.531 RIGHT WRIST PAIN: Primary | ICD-10-CM

## 2023-04-10 NOTE — TELEPHONE ENCOUNTER
Patients  is requesting patient be seen by Dr. Gregory Sood for a right wrist fracture. Can patient be double booked?

## 2023-04-10 NOTE — TELEPHONE ENCOUNTER
Advised patient to call [facility] orthopedics [phone] no current openings at [facility] orthopedics. Advised patient to call our office back if unsuccessful in obtaining an appt within the week. Patient verbalized understanding had no further questions.

## 2023-04-10 NOTE — TELEPHONE ENCOUNTER
Spoke to patient and patient wanted me to reach . I left a voicemail to call back and schedule appt tomorrow with  at [address] at 930 or 3;30. Left detailed message regarding repeated xray out of splint.

## 2023-04-10 NOTE — TELEPHONE ENCOUNTER
Please add this patient to Dr. Felicita Up schedule for tomorrow in [place]. Patient will need repeat imaging out of her splint. Please instruct patient to arrive 15-20 min early and come to our office before XR to have her splint removed. Order is placed and ready to schedule.  Thank you!!!

## 2023-04-17 ENCOUNTER — EKG ENCOUNTER (OUTPATIENT)
Dept: LAB | Age: 68
End: 2023-04-17
Attending: FAMILY MEDICINE
Payer: MEDICARE

## 2023-04-17 ENCOUNTER — OFFICE VISIT (OUTPATIENT)
Dept: FAMILY MEDICINE CLINIC | Facility: CLINIC | Age: 68
End: 2023-04-17

## 2023-04-17 ENCOUNTER — LAB ENCOUNTER (OUTPATIENT)
Dept: LAB | Age: 68
End: 2023-04-17
Attending: FAMILY MEDICINE
Payer: MEDICARE

## 2023-04-17 VITALS
SYSTOLIC BLOOD PRESSURE: 110 MMHG | DIASTOLIC BLOOD PRESSURE: 62 MMHG | HEART RATE: 90 BPM | BODY MASS INDEX: 23.3 KG/M2 | TEMPERATURE: 98 F | HEIGHT: 62 IN | WEIGHT: 126.63 LBS

## 2023-04-17 DIAGNOSIS — Z01.818 PREOPERATIVE CLEARANCE: Primary | ICD-10-CM

## 2023-04-17 DIAGNOSIS — M80.00XD AGE-RELATED OSTEOPOROSIS WITH CURRENT PATHOLOGICAL FRACTURE WITH ROUTINE HEALING, SUBSEQUENT ENCOUNTER: ICD-10-CM

## 2023-04-17 DIAGNOSIS — S62.101P CLOSED FRACTURE OF RIGHT WRIST WITH MALUNION, SUBSEQUENT ENCOUNTER: ICD-10-CM

## 2023-04-17 DIAGNOSIS — Z01.818 PREOPERATIVE CLEARANCE: ICD-10-CM

## 2023-04-17 DIAGNOSIS — Z01.818 PREOPERATIVE EXAMINATION, UNSPECIFIED: Primary | ICD-10-CM

## 2023-04-17 LAB
ALBUMIN SERPL-MCNC: 4 G/DL (ref 3.4–5)
ALBUMIN/GLOB SERPL: 1.1 {RATIO} (ref 1–2)
ALP LIVER SERPL-CCNC: 88 U/L
ALT SERPL-CCNC: 23 U/L
ANION GAP SERPL CALC-SCNC: 3 MMOL/L (ref 0–18)
AST SERPL-CCNC: 22 U/L (ref 15–37)
ATRIAL RATE: 98 BPM
BASOPHILS # BLD AUTO: 0.07 X10(3) UL (ref 0–0.2)
BASOPHILS NFR BLD AUTO: 2 %
BILIRUB SERPL-MCNC: 0.8 MG/DL (ref 0.1–2)
BUN BLD-MCNC: 14 MG/DL (ref 7–18)
BUN/CREAT SERPL: 19.2 (ref 10–20)
CALCIUM BLD-MCNC: 9.2 MG/DL (ref 8.5–10.1)
CHLORIDE SERPL-SCNC: 108 MMOL/L (ref 98–112)
CO2 SERPL-SCNC: 31 MMOL/L (ref 21–32)
CREAT BLD-MCNC: 0.73 MG/DL
DEPRECATED RDW RBC AUTO: 43.2 FL (ref 35.1–46.3)
EOSINOPHIL # BLD AUTO: 0.12 X10(3) UL (ref 0–0.7)
EOSINOPHIL NFR BLD AUTO: 3.4 %
ERYTHROCYTE [DISTWIDTH] IN BLOOD BY AUTOMATED COUNT: 11.9 % (ref 11–15)
FASTING STATUS PATIENT QL REPORTED: YES
GFR SERPLBLD BASED ON 1.73 SQ M-ARVRAT: 90 ML/MIN/1.73M2 (ref 60–?)
GLOBULIN PLAS-MCNC: 3.7 G/DL (ref 2.8–4.4)
GLUCOSE BLD-MCNC: 110 MG/DL (ref 70–99)
HCT VFR BLD AUTO: 40.3 %
HGB BLD-MCNC: 13.5 G/DL
IMM GRANULOCYTES # BLD AUTO: 0.01 X10(3) UL (ref 0–1)
IMM GRANULOCYTES NFR BLD: 0.3 %
LYMPHOCYTES # BLD AUTO: 1.11 X10(3) UL (ref 1–4)
LYMPHOCYTES NFR BLD AUTO: 31.1 %
MCH RBC QN AUTO: 32.9 PG (ref 26–34)
MCHC RBC AUTO-ENTMCNC: 33.5 G/DL (ref 31–37)
MCV RBC AUTO: 98.3 FL
MONOCYTES # BLD AUTO: 0.41 X10(3) UL (ref 0.1–1)
MONOCYTES NFR BLD AUTO: 11.5 %
NEUTROPHILS # BLD AUTO: 1.85 X10 (3) UL (ref 1.5–7.7)
NEUTROPHILS # BLD AUTO: 1.85 X10(3) UL (ref 1.5–7.7)
NEUTROPHILS NFR BLD AUTO: 51.7 %
OSMOLALITY SERPL CALC.SUM OF ELEC: 295 MOSM/KG (ref 275–295)
P AXIS: 65 DEGREES
P-R INTERVAL: 146 MS
PLATELET # BLD AUTO: 286 10(3)UL (ref 150–450)
POTASSIUM SERPL-SCNC: 4.2 MMOL/L (ref 3.5–5.1)
PROT SERPL-MCNC: 7.7 G/DL (ref 6.4–8.2)
Q-T INTERVAL: 350 MS
QRS DURATION: 58 MS
QTC CALCULATION (BEZET): 446 MS
R AXIS: 18 DEGREES
RBC # BLD AUTO: 4.1 X10(6)UL
SODIUM SERPL-SCNC: 142 MMOL/L (ref 136–145)
T AXIS: 37 DEGREES
VENTRICULAR RATE: 98 BPM
WBC # BLD AUTO: 3.6 X10(3) UL (ref 4–11)

## 2023-04-17 PROCEDURE — 93005 ELECTROCARDIOGRAM TRACING: CPT

## 2023-04-17 PROCEDURE — 80053 COMPREHEN METABOLIC PANEL: CPT

## 2023-04-17 PROCEDURE — 93010 ELECTROCARDIOGRAM REPORT: CPT | Performed by: INTERNAL MEDICINE

## 2023-04-17 PROCEDURE — 36415 COLL VENOUS BLD VENIPUNCTURE: CPT

## 2023-04-17 PROCEDURE — 99214 OFFICE O/P EST MOD 30 MIN: CPT | Performed by: FAMILY MEDICINE

## 2023-04-17 PROCEDURE — 85025 COMPLETE CBC W/AUTO DIFF WBC: CPT

## 2023-04-18 NOTE — PROGRESS NOTES
Reviewed EKG and compared to prior EKGs in the system. There have been no changes. Patient is cleared for planned surgery.

## 2023-05-10 ENCOUNTER — TELEPHONE (OUTPATIENT)
Dept: FAMILY MEDICINE CLINIC | Facility: CLINIC | Age: 68
End: 2023-05-10

## 2023-05-10 NOTE — TELEPHONE ENCOUNTER
Patient Outreach to schedule a Medicare Annual wellness appointment with Dr. Linwood Menjivar. Last visit on 10/27/2022.

## 2023-10-02 ENCOUNTER — OFFICE VISIT (OUTPATIENT)
Dept: FAMILY MEDICINE CLINIC | Facility: CLINIC | Age: 68
End: 2023-10-02

## 2023-10-02 VITALS
HEIGHT: 62 IN | TEMPERATURE: 97 F | WEIGHT: 119 LBS | DIASTOLIC BLOOD PRESSURE: 70 MMHG | BODY MASS INDEX: 21.9 KG/M2 | HEART RATE: 85 BPM | SYSTOLIC BLOOD PRESSURE: 130 MMHG

## 2023-10-02 DIAGNOSIS — Z00.00 ADULT GENERAL MEDICAL EXAM: Primary | ICD-10-CM

## 2023-10-02 DIAGNOSIS — F43.22 ADJUSTMENT DISORDER WITH ANXIOUS MOOD: ICD-10-CM

## 2023-10-02 DIAGNOSIS — Z12.31 VISIT FOR SCREENING MAMMOGRAM: ICD-10-CM

## 2023-10-02 DIAGNOSIS — L71.9 ROSACEA: ICD-10-CM

## 2023-10-02 DIAGNOSIS — R41.3 MEMORY LOSS: ICD-10-CM

## 2023-10-02 DIAGNOSIS — E78.2 MIXED HYPERLIPIDEMIA: ICD-10-CM

## 2023-10-02 DIAGNOSIS — M81.0 AGE-RELATED OSTEOPOROSIS WITHOUT CURRENT PATHOLOGICAL FRACTURE: ICD-10-CM

## 2023-10-02 DIAGNOSIS — R73.9 HYPERGLYCEMIA: ICD-10-CM

## 2023-10-02 DIAGNOSIS — Z78.0 POSTMENOPAUSAL: ICD-10-CM

## 2023-10-02 DIAGNOSIS — Z00.00 ENCOUNTER FOR ANNUAL HEALTH EXAMINATION: ICD-10-CM

## 2023-10-02 DIAGNOSIS — E55.9 VITAMIN D DEFICIENCY: ICD-10-CM

## 2023-10-02 DIAGNOSIS — D72.819 LEUKOPENIA, UNSPECIFIED TYPE: ICD-10-CM

## 2023-10-02 PROCEDURE — 1126F AMNT PAIN NOTED NONE PRSNT: CPT | Performed by: FAMILY MEDICINE

## 2023-10-02 PROCEDURE — G0439 PPPS, SUBSEQ VISIT: HCPCS | Performed by: FAMILY MEDICINE

## 2023-10-02 RX ORDER — ESCITALOPRAM OXALATE 5 MG/1
5 TABLET ORAL
Qty: 90 TABLET | Refills: 3 | Status: SHIPPED | OUTPATIENT
Start: 2023-10-02

## 2023-10-09 ENCOUNTER — APPOINTMENT (OUTPATIENT)
Dept: GENERAL RADIOLOGY | Facility: HOSPITAL | Age: 68
End: 2023-10-09

## 2023-10-09 ENCOUNTER — HOSPITAL ENCOUNTER (EMERGENCY)
Facility: HOSPITAL | Age: 68
Discharge: HOME | End: 2023-10-10

## 2023-10-09 DIAGNOSIS — S92.492A OTHER FRACTURE OF LEFT GREAT TOE, INITIAL ENCOUNTER FOR CLOSED FRACTURE: Primary | ICD-10-CM

## 2023-10-09 PROCEDURE — 99283 EMERGENCY DEPT VISIT LOW MDM: CPT

## 2023-10-09 PROCEDURE — 28490 TREAT BIG TOE FRACTURE: CPT

## 2023-10-09 PROCEDURE — 73660 X-RAY EXAM OF TOE(S): CPT

## 2023-10-10 ENCOUNTER — LAB ENCOUNTER (OUTPATIENT)
Dept: LAB | Age: 68
End: 2023-10-10
Attending: FAMILY MEDICINE

## 2023-10-10 VITALS
DIASTOLIC BLOOD PRESSURE: 89 MMHG | HEART RATE: 88 BPM | SYSTOLIC BLOOD PRESSURE: 124 MMHG | TEMPERATURE: 98 F | OXYGEN SATURATION: 98 % | RESPIRATION RATE: 18 BRPM

## 2023-10-10 DIAGNOSIS — E78.2 MIXED HYPERLIPIDEMIA: ICD-10-CM

## 2023-10-10 DIAGNOSIS — D72.819 LEUKOPENIA, UNSPECIFIED TYPE: ICD-10-CM

## 2023-10-10 DIAGNOSIS — R73.9 HYPERGLYCEMIA: ICD-10-CM

## 2023-10-10 DIAGNOSIS — R41.3 MEMORY LOSS: ICD-10-CM

## 2023-10-10 DIAGNOSIS — E55.9 VITAMIN D DEFICIENCY: ICD-10-CM

## 2023-10-10 LAB
ALBUMIN SERPL-MCNC: 3.9 G/DL (ref 3.4–5)
ALBUMIN/GLOB SERPL: 1.1 {RATIO} (ref 1–2)
ALP LIVER SERPL-CCNC: 81 U/L
ALT SERPL-CCNC: 24 U/L
ANION GAP SERPL CALC-SCNC: 7 MMOL/L (ref 0–18)
AST SERPL-CCNC: 22 U/L (ref 15–37)
BASOPHILS # BLD AUTO: 0.06 X10(3) UL (ref 0–0.2)
BASOPHILS NFR BLD AUTO: 1.6 %
BILIRUB SERPL-MCNC: 0.8 MG/DL (ref 0.1–2)
BUN BLD-MCNC: 13 MG/DL (ref 7–18)
BUN/CREAT SERPL: 16.7 (ref 10–20)
CALCIUM BLD-MCNC: 9.1 MG/DL (ref 8.5–10.1)
CHLORIDE SERPL-SCNC: 110 MMOL/L (ref 98–112)
CHOLEST SERPL-MCNC: 268 MG/DL (ref ?–200)
CO2 SERPL-SCNC: 27 MMOL/L (ref 21–32)
CREAT BLD-MCNC: 0.78 MG/DL
DEPRECATED RDW RBC AUTO: 47 FL (ref 35.1–46.3)
EGFRCR SERPLBLD CKD-EPI 2021: 82 ML/MIN/1.73M2 (ref 60–?)
EOSINOPHIL # BLD AUTO: 0.1 X10(3) UL (ref 0–0.7)
EOSINOPHIL NFR BLD AUTO: 2.6 %
ERYTHROCYTE [DISTWIDTH] IN BLOOD BY AUTOMATED COUNT: 12.6 % (ref 11–15)
EST. AVERAGE GLUCOSE BLD GHB EST-MCNC: 91 MG/DL (ref 68–126)
FASTING PATIENT LIPID ANSWER: YES
FASTING STATUS PATIENT QL REPORTED: YES
GLOBULIN PLAS-MCNC: 3.7 G/DL (ref 2.8–4.4)
GLUCOSE BLD-MCNC: 86 MG/DL (ref 70–99)
HBA1C MFR BLD: 4.8 % (ref ?–5.7)
HCT VFR BLD AUTO: 41.4 %
HDLC SERPL-MCNC: 105 MG/DL (ref 40–59)
HGB BLD-MCNC: 13.5 G/DL
IMM GRANULOCYTES # BLD AUTO: 0.01 X10(3) UL (ref 0–1)
IMM GRANULOCYTES NFR BLD: 0.3 %
LDLC SERPL CALC-MCNC: 150 MG/DL (ref ?–100)
LYMPHOCYTES # BLD AUTO: 1.17 X10(3) UL (ref 1–4)
LYMPHOCYTES NFR BLD AUTO: 30.5 %
MCH RBC QN AUTO: 32.9 PG (ref 26–34)
MCHC RBC AUTO-ENTMCNC: 32.6 G/DL (ref 31–37)
MCV RBC AUTO: 101 FL
MONOCYTES # BLD AUTO: 0.42 X10(3) UL (ref 0.1–1)
MONOCYTES NFR BLD AUTO: 11 %
NEUTROPHILS # BLD AUTO: 2.07 X10 (3) UL (ref 1.5–7.7)
NEUTROPHILS # BLD AUTO: 2.07 X10(3) UL (ref 1.5–7.7)
NEUTROPHILS NFR BLD AUTO: 54 %
NONHDLC SERPL-MCNC: 163 MG/DL (ref ?–130)
OSMOLALITY SERPL CALC.SUM OF ELEC: 297 MOSM/KG (ref 275–295)
PLATELET # BLD AUTO: 262 10(3)UL (ref 150–450)
POTASSIUM SERPL-SCNC: 4.2 MMOL/L (ref 3.5–5.1)
PROT SERPL-MCNC: 7.6 G/DL (ref 6.4–8.2)
RBC # BLD AUTO: 4.1 X10(6)UL
SODIUM SERPL-SCNC: 144 MMOL/L (ref 136–145)
TRIGL SERPL-MCNC: 79 MG/DL (ref 30–149)
TSI SER-ACNC: 1.54 MIU/ML (ref 0.36–3.74)
VIT D+METAB SERPL-MCNC: 36.9 NG/ML (ref 30–100)
VLDLC SERPL CALC-MCNC: 15 MG/DL (ref 0–30)
WBC # BLD AUTO: 3.8 X10(3) UL (ref 4–11)

## 2023-10-10 PROCEDURE — 80061 LIPID PANEL: CPT

## 2023-10-10 PROCEDURE — 84443 ASSAY THYROID STIM HORMONE: CPT

## 2023-10-10 PROCEDURE — 82306 VITAMIN D 25 HYDROXY: CPT

## 2023-10-10 PROCEDURE — 36415 COLL VENOUS BLD VENIPUNCTURE: CPT

## 2023-10-10 PROCEDURE — 80053 COMPREHEN METABOLIC PANEL: CPT

## 2023-10-10 PROCEDURE — 85025 COMPLETE CBC W/AUTO DIFF WBC: CPT

## 2023-10-10 PROCEDURE — 83036 HEMOGLOBIN GLYCOSYLATED A1C: CPT

## 2023-10-10 NOTE — ED QUICK NOTES
Discharge instructions including follow-up care and signs and symptoms to return to ED were reviewed and discussed with patient. Pt verbalized understanding to all information and all questions asked were answered at this time. Pt is AAOx4, respirations noted as even and unlabored, skin warm and dry, and there are no signs or symptoms of distress noted at this time. Pt ambulatory with a steady gait to exit with family.

## 2023-10-11 ENCOUNTER — TELEPHONE (OUTPATIENT)
Dept: PODIATRY CLINIC | Facility: CLINIC | Age: 68
End: 2023-10-11

## 2023-10-11 ENCOUNTER — OFFICE VISIT (OUTPATIENT)
Dept: PODIATRY CLINIC | Facility: CLINIC | Age: 68
End: 2023-10-11

## 2023-10-11 DIAGNOSIS — M79.675 GREAT TOE PAIN, LEFT: ICD-10-CM

## 2023-10-11 DIAGNOSIS — S92.412A DISPLACED FRACTURE OF PROXIMAL PHALANX OF LEFT GREAT TOE, INITIAL ENCOUNTER FOR CLOSED FRACTURE: Primary | ICD-10-CM

## 2023-10-11 DIAGNOSIS — S92.912A: ICD-10-CM

## 2023-10-11 PROCEDURE — 99204 OFFICE O/P NEW MOD 45 MIN: CPT | Performed by: PODIATRIST

## 2023-10-11 PROCEDURE — L3260 AMBULATORY SURGICAL BOOT EAC: HCPCS | Performed by: PODIATRIST

## 2023-10-11 PROCEDURE — 1126F AMNT PAIN NOTED NONE PRSNT: CPT | Performed by: PODIATRIST

## 2023-10-11 NOTE — TELEPHONE ENCOUNTER
Procedure:   Left hallux inter phalangeal joint arthrodesis  CPT code:   Length of Surgery: 1 hour  Any Instruments: Elizabeth mini screw set, elizabeth vitoss bone graft  Call patient: ASAP  Anesthesia: MAC  Location: [facility]  Assistance: none  Pacemaker: No  Anticoagulants: No  Nickel Allergy: No  Latex Allergy: No  Diagnosis/ICD Code:   No diagnosis found.      Please schedule for 10/19

## 2023-10-11 NOTE — PROGRESS NOTES
[facility], [facility] Podiatry  Progress Note    Rose Starks is a 70year old female. Patient presents with: Injury: Left big toe when she hit her toe into the wall - was in ER and has x-rays in the system - has discomfort rated as 3/10 with weight bearing and walking         HPI:     This is a pleasant female that presents to clinic today due to left great toe injury which occurred on 10/9/23. She hit her toe in the wall. She went to [address] [place] [address] on 10/9/23 and did have xrays which showed fracture. She did have the toe taped at the ED and is wearing regular shoes. She states it is painful when walking. Allergies: Cat Hair Extract, Dander, and Scallops   Current Outpatient Medications   Medication Sig Dispense Refill    escitalopram 5 MG Oral Tab Take 1 tablet (5 mg total) by mouth daily with dinner. 90 tablet 3    CLOTRIMAZOLE 1 % External Cream APPLY TO LABIA TWICE DAILY; USE ADDITION 5 DAYS AFTER ALL SYMPTOMS RESOLVED 45 g 0    Ibandronate Sodium 150 MG Oral Tab Take 1 tablet (150 mg total) by mouth every 30 (thirty) days. Help with bone density and osteoporosis. 3 tablet 3    CALCIUM OR Take 500 mg by mouth in the morning, at noon, and at bedtime. estradiol 0.1 MG/GM Vaginal Cream  (Patient not taking: Reported on 2/6/2023)      metRONIDAZOLE 0.75 % External Cream Apply topically 2 (two) times daily. (Patient not taking: Reported on 2/6/2023)      Lubricants (K-Y JELLY) External Gel Apply 1 Application topically.         Past Medical History:   Diagnosis Date    Leg fracture 2007    Low blood pressure       Past Surgical History:   Procedure Laterality Date    COLONOSCOPY  04/2020    COLONOSCOPY N/A 5/8/2020    Procedure: COLONOSCOPY, POSSIBLE BIOPSY, POSSIBLE POLYPECTOMY 63388;  Surgeon: Irving Abdul MD;  Location: [address] [place] [address] LOCALIZATION WIRE 1 SITE RIGHT ([id number])        Family History   Problem Relation Age of Onset    Colon Cancer Mother     Dementia Mother Dementia Father     Colon Cancer Maternal Grandmother     Cancer Sister         vaginal      Social History    Socioeconomic History      Marital status:     Tobacco Use      Smoking status: Never      Smokeless tobacco: Never    Vaping Use      Vaping Use: Never used    Substance and Sexual Activity      Alcohol use: Yes        Alcohol/week: 0.0 standard drinks of alcohol        Comment: wine, 2-3 glasses daily      Drug use: No    Other Topics      Concerns:        Caffeine Concern: No        Exercise: Yes          5-6 x weekly, classes/walking        History of tanning: No        Pt has a pacemaker: No        Reaction to local anesthetic: No        Left Handed: Yes        Right Handed: No        Currently spends a great deal of time in the sun: No        Hx of Spending [place] of Time in Sun: Yes        Bad sunburns in the past: Yes        Tanning Salons in the Past: No        Hx of Radiation Treatments: No          REVIEW OF SYSTEMS:   Denies nausea, fever, chills  No calf pain  No other muscle or joint aches  Denies chest pain or shortness of breath. EXAM:   There were no vitals taken for this visit. Constitutional:   Patient in no apparent distress. Well kept Of normal body habitus. Alert and oriented to person, place, and time. Integumentary examination:   There are no varicosities. Skin appears moist, warm, and supple with positive hair growth. There are no color changes. No open lesions. Vascular examination:   DP pulse is 2/4  PT pulse is 2/4  Capillary refill is immediate    Left hallux edema    Neurological examination:   Vibratory (128-Hz tuning fork) sensation is present to right and is present to left. Sharp/dull is present to right and is present to left. Musculoskeletal examination:  Muscle Strength is 5/5.     Left hallux with moderate lateral subluxation at the IPJ and POP      LABS & IMAGING:     Lab Results   Component Value Date    GLU 86 10/10/2023    BUN 13 10/10/2023    CREATSERUM 0.78 10/10/2023    BUNCREA 16.7 10/10/2023    ANIONGAP 7 10/10/2023    GFRAA 85 07/25/2022    GFRNAA 74 07/25/2022    CA 9.1 10/10/2023     10/10/2023    K 4.2 10/10/2023     10/10/2023    CO2 27.0 10/10/2023    OSMOCALC 297 (H) 10/10/2023        Lab Results   Component Value Date    EAG 91 10/10/2023    A1C 4.8 10/10/2023        No results found. ASSESSMENT AND PLAN:   Diagnoses and all orders for this visit:    Displaced fracture of proximal phalanx of left great toe, initial encounter for closed fracture    Closed fracture subluxation of interphalangeal joint of toe of left foot, initial encounter    Great toe pain, left        Plan:     Evaluated the patient and discussed treatment options. Reviewed the patients x-rays with the patient. Fracture care and treatment plan discussed. Discussed the importance of immobilization. Discussed conservative management   Discussed surgical management and indications for both. Recommend cryotherapy/icing, rest, and elevation. Xray Left hallux: 10/9/23  Impacted fracture of the distal proximal phalanx of the great toe with moderate lateral angulation of the distal fracture fragment. Fitted and dispensed post op shoe left foot, pt to be PWB using heel and to limit ambulation. Due to the severity of the fracture angulation recommended surgery, pt agrees    Will proceed with Left hallux inter phalangeal joint arthrodesis    Pt will be NWB left foot for approx 2 weeks    The patient has been advised of the approximate disability involved for these procedures. In addition, the patient has been advised as to the alternatives of care, including continued conservative care as well as surgical procedures. The patient has failed all conservative treatment at this point.  The patient understands that if surgical procedures are performed, there are risks and complications that could occur, including but not limited to: hematoma formation, damage to the nervous system, seroma formation, development of a DVT or phlebitis, infection, painful scar tissue formation, stiffness, limited motion, delayed-union, non-union, mal-union, impaired healing, increased chance of swelling, swelling, reaction to implanted biomaterials, over-correction, under-correction with recurrence of the deformities, continued pain, loss of limb, loss of life, and the possibility that future surgery may need to be performed. The patient was given the opportunity to ask questions which were answered. The patient voiced no concerns and will consider all these options. Patient desires surgical intervention. No guarantees were given or implied. Pt consented freely to surgical intervention. I spent approximately 30 minutes discussing the surgical procedures at length. I reviewed in detail the procedure to be performed, postoperative course, disability to be expected, healing time, prognosis and the importance of their following the recommended postoperative care. Possible complications discussed included but not limited to recurrence of deformity, postoperative pain, swelling, stiffness, rejection of the implant if utilized, return to surgery, infection, residual pain, possible blood clot formation, bleeding, etc. Possible complications relating to over activity and limitations during the postoperative period were reviewed. The patient has responsibilities to help insure successful outcome of the surgery. Postoperative oral and written instructions were reviewed and postoperative course of treatment discussed in detail. Management of postoperative pain was discussed. All questions related to preoperative, operative and postoperative course of treatment were answered to their understanding and satisfaction. RTO and follow up after surgery is necessary and expected and agreed to by the patient.  The patient acknowledged understanding of the above and agrees to follow all instructions and protocols. No guarantee or warranty was given or implied as to the results of the surgery. No follow-ups on file.     Spring Trimble DPM  10/11/2023

## 2023-10-12 ENCOUNTER — PATIENT MESSAGE (OUTPATIENT)
Dept: PODIATRY CLINIC | Facility: CLINIC | Age: 68
End: 2023-10-12

## 2023-10-12 DIAGNOSIS — S92.912A: ICD-10-CM

## 2023-10-12 DIAGNOSIS — S92.412A DISPLACED FRACTURE OF PROXIMAL PHALANX OF LEFT GREAT TOE, INITIAL ENCOUNTER FOR CLOSED FRACTURE: Primary | ICD-10-CM

## 2023-10-12 DIAGNOSIS — M79.675 GREAT TOE PAIN, LEFT: ICD-10-CM

## 2023-10-12 NOTE — TELEPHONE ENCOUNTER
Lvangel directly at [id number]. Let patient know I have scheduled her surgery at [address] for 10/19 at 9 am. I also made her aware that I have send over pre op instructions to her [employer] account and asked that she call back so we can over in greater detail together.

## 2023-10-13 ENCOUNTER — PATIENT OUTREACH (OUTPATIENT)
Dept: CASE MANAGEMENT | Age: 68
End: 2023-10-13

## 2023-10-13 NOTE — PROGRESS NOTES
1st attempt ER f/up apt request  No answer, LVMTCB to schedule apts  POD -existing apt (10/11)  PCP -unable to contact  Closing encounter

## 2023-10-16 NOTE — TELEPHONE ENCOUNTER
S/W PATIENT ON THIS DAY AND WENT OVER PRE OP INSTRUCTIONS TOGETHER AND BOOKED BOTH POST OP APPOINTMENTS TOGETHER. PATIENT EXPRESSED VERBAL UNDERSTANDING OF ALL INSTRUCTIONS AND WAS ALSO INFORMED THAT INSTRUCTIONS ARE AVAILABLE TO PATIENT VIA [employer] ACCOUNT.

## 2023-10-21 ENCOUNTER — TELEPHONE (OUTPATIENT)
Dept: PODIATRY CLINIC | Facility: CLINIC | Age: 68
End: 2023-10-21

## 2023-10-21 NOTE — TELEPHONE ENCOUNTER
Procedure date: 10/19/2023      left hallux inter phalangeal joint arthrodesis     How are you feeling? / I'm feeling fine/ OK  Any bleeding? / no   Is the dressing dry & intact?/yes   Level of pain? / 1-2  Character of pain: / mild discomfort by the 1st digit  Onset of pain:/ 7pm 10/19/2023  Aggravating factors:/ nothing  Duration of pain:/very short  Alleviating factors:/ elevation  Are you taking the prescribed medication? Yes, suggested ibuprofen alternation with her hydrocodone  Are you following all of the PO instructions? / appetite normal    Other Comments: Encouraged to wear the surgical shoe when getting out of bed at night    Follow-up appt  date: 10/27/2023    Pt was advised if they have any concerns after hours to call our office and they would be directed to on call physician. /DONE

## 2023-10-27 ENCOUNTER — HOSPITAL ENCOUNTER (OUTPATIENT)
Dept: GENERAL RADIOLOGY | Facility: HOSPITAL | Age: 68
Discharge: HOME | End: 2023-10-27
Attending: PODIATRIST

## 2023-10-27 ENCOUNTER — OFFICE VISIT (OUTPATIENT)
Dept: PODIATRY CLINIC | Facility: CLINIC | Age: 68
End: 2023-10-27

## 2023-10-27 DIAGNOSIS — Z98.890 S/P FOOT SURGERY, LEFT: Primary | ICD-10-CM

## 2023-10-27 DIAGNOSIS — Z98.890 S/P FOOT SURGERY, LEFT: ICD-10-CM

## 2023-10-27 DIAGNOSIS — Z98.890 POST-OPERATIVE STATE: ICD-10-CM

## 2023-10-27 PROCEDURE — 73630 X-RAY EXAM OF FOOT: CPT | Performed by: PODIATRIST

## 2023-10-27 PROCEDURE — 1126F AMNT PAIN NOTED NONE PRSNT: CPT | Performed by: PODIATRIST

## 2023-10-27 PROCEDURE — 99024 POSTOP FOLLOW-UP VISIT: CPT | Performed by: PODIATRIST

## 2023-10-27 NOTE — PROGRESS NOTES
[facility], [facility] Podiatry  Progress Note    Veena Forrest is a 70year old female. Patient presents with:  Post-Op: 1st- sx on 10/19/23 -left great toe- patient denies pain at this time- no numbness-no tingling- feel uncomfortable. HPI:     This is a pleasant female that presents to clinic 1 week S/P left hallux IPJ fusion. She admits to walking a lot and did  leaves over the weekend. She did take off her dressings a few days and has not been covering her incision sites. She removed the post op and is wearing regular shoes. She denies any pain. Allergies: Cat Hair Extract, Dander, and Scallops   Current Outpatient Medications   Medication Sig Dispense Refill    atorvastatin (LIPITOR) 10 MG Oral Tab Take 1 tablet (10 mg total) by mouth nightly. For cholesterol. 90 tablet 1    escitalopram 5 MG Oral Tab Take 1 tablet (5 mg total) by mouth daily with dinner. 90 tablet 3    CLOTRIMAZOLE 1 % External Cream APPLY TO LABIA TWICE DAILY; USE ADDITION 5 DAYS AFTER ALL SYMPTOMS RESOLVED 45 g 0    CALCIUM OR Take 500 mg by mouth in the morning, at noon, and at bedtime. estradiol 0.1 MG/GM Vaginal Cream       metRONIDAZOLE 0.75 % External Cream Apply topically 2 (two) times daily. Lubricants (K-Y JELLY) External Gel Apply 1 Application topically. Ibandronate Sodium 150 MG Oral Tab Take 1 tablet (150 mg total) by mouth every 30 (thirty) days. Help with bone density and osteoporosis.  3 tablet 3      Past Medical History:   Diagnosis Date    Leg fracture 2007    Low blood pressure       Past Surgical History:   Procedure Laterality Date    COLONOSCOPY  04/2020    COLONOSCOPY N/A 5/8/2020    Procedure: COLONOSCOPY, POSSIBLE BIOPSY, POSSIBLE POLYPECTOMY 46491;  Surgeon: Donna Rodriguez MD;  Location: [address] [place] [address] LOCALIZATION WIRE 1 SITE RIGHT ([id number])        Family History   Problem Relation Age of Onset    Colon Cancer Mother     Dementia Mother     Dementia Father     Colon Cancer Maternal Grandmother     Cancer Sister         vaginal      Social History    Socioeconomic History      Marital status:     Tobacco Use      Smoking status: Never      Smokeless tobacco: Never    Vaping Use      Vaping Use: Never used    Substance and Sexual Activity      Alcohol use: Yes        Alcohol/week: 0.0 standard drinks of alcohol        Comment: wine, 2-3 glasses daily      Drug use: No    Other Topics      Concerns:        Caffeine Concern: No        Exercise: Yes          5-6 x weekly, classes/walking        History of tanning: No        Pt has a pacemaker: No        Reaction to local anesthetic: No        Left Handed: Yes        Right Handed: No        Currently spends a great deal of time in the sun: No        Hx of Spending [place] of Time in Sun: Yes        Bad sunburns in the past: Yes        Tanning Salons in the Past: No        Hx of Radiation Treatments: No          REVIEW OF SYSTEMS:   Denies nausea, fever, chills  No calf pain  No other muscle or joint aches  Denies chest pain or shortness of breath. EXAM:   There were no vitals taken for this visit. Constitutional:   Patient in no apparent distress. Well kept Of normal body habitus. Alert and oriented to person, place, and time. Integumentary examination:   There are no varicosities. Skin appears moist, warm, and supple with positive hair growth. Left hallux incision sites are well adhered with sutures intact and no acute SOI. Vascular examination:   DP pulse is 2/4  PT pulse is 2/4  Capillary refill is immediate    Left hallux edema    Neurological examination:   Vibratory (128-Hz tuning fork) sensation is present to right and is present to left. Sharp/dull is present to right and is present to left. Musculoskeletal examination:  Muscle Strength is 5/5.     Left hallux rectus      LABS & IMAGING:     Lab Results   Component Value Date    GLU 86 10/10/2023    BUN 13 10/10/2023    CREATSERUM 0.78 10/10/2023    BUNCREA 16.7 10/10/2023    ANIONGAP 7 10/10/2023    GFRAA 85 07/25/2022    GFRNAA 74 07/25/2022    CA 9.1 10/10/2023     10/10/2023    K 4.2 10/10/2023     10/10/2023    CO2 27.0 10/10/2023    OSMOCALC 297 (H) 10/10/2023        Lab Results   Component Value Date    EAG 91 10/10/2023    A1C 4.8 10/10/2023        No results found. ASSESSMENT AND PLAN:   Diagnoses and all orders for this visit:    S/P foot surgery, left    Post-operative state          Plan:       Evaluated left hallux incision sites which are healing well with no SOI. Applied DSD to left foot, pt to keep CDI     Pt to be PWB left foot with post op shoe using heel  Pt to limit ambulation and to elevate left foot when resting    Discussed in great detail with pt and her  the importance of post op compliance. I explained to her that she has a higher chance of post operative complications due to her excessive ambulation, removing her dressings early and not wearing her post op shoe. She does have a higher chance of delayed union, hardware failure, wound dehiscence and possible infection. Ordered left foot xrays    RTC 1 week for xray review. May hold off on suture removal due to her edema. No follow-ups on file.     Keira Fitzpatrick DPM  10/27/23

## 2023-10-30 ENCOUNTER — TELEPHONE (OUTPATIENT)
Dept: FAMILY MEDICINE CLINIC | Facility: CLINIC | Age: 68
End: 2023-10-30

## 2023-10-30 ENCOUNTER — OFFICE VISIT (OUTPATIENT)
Dept: FAMILY MEDICINE CLINIC | Facility: CLINIC | Age: 68
End: 2023-10-30

## 2023-10-30 VITALS
WEIGHT: 121 LBS | HEART RATE: 79 BPM | HEIGHT: 62 IN | SYSTOLIC BLOOD PRESSURE: 134 MMHG | TEMPERATURE: 98 F | DIASTOLIC BLOOD PRESSURE: 70 MMHG | BODY MASS INDEX: 22.26 KG/M2

## 2023-10-30 DIAGNOSIS — N93.9 ABNORMAL UTERINE BLEEDING (AUB): Primary | ICD-10-CM

## 2023-10-30 DIAGNOSIS — Z23 NEED FOR VACCINATION: ICD-10-CM

## 2023-10-30 DIAGNOSIS — R41.3 MEMORY LOSS: ICD-10-CM

## 2023-10-30 DIAGNOSIS — Z01.818 PREOP EXAMINATION: ICD-10-CM

## 2023-10-30 NOTE — TELEPHONE ENCOUNTER
I have clearance notes along with recent labs to [facility] with Dr. Nikki Childers to [phone] confirmation received

## 2023-11-03 ENCOUNTER — OFFICE VISIT (OUTPATIENT)
Dept: PODIATRY CLINIC | Facility: CLINIC | Age: 68
End: 2023-11-03
Payer: MEDICARE

## 2023-11-03 DIAGNOSIS — Z98.890 S/P FOOT SURGERY, LEFT: Primary | ICD-10-CM

## 2023-11-03 DIAGNOSIS — Z98.890 POST-OPERATIVE STATE: ICD-10-CM

## 2023-11-03 DIAGNOSIS — L03.032 CELLULITIS OF LEFT TOE: ICD-10-CM

## 2023-11-03 PROCEDURE — 99024 POSTOP FOLLOW-UP VISIT: CPT | Performed by: PODIATRIST

## 2023-11-03 RX ORDER — CEPHALEXIN 500 MG/1
500 CAPSULE ORAL 2 TIMES DAILY
Qty: 14 CAPSULE | Refills: 0 | Status: SHIPPED | OUTPATIENT
Start: 2023-11-03 | End: 2023-11-10

## 2023-11-03 NOTE — PROGRESS NOTES
[facility], [facility] Podiatry  Progress Note    Trell Henriquez is a 70year old female. Patient presents with:  Post-Op: 2nd POV L foot-  Denies any pain or concern. States she is healing well. HPI:     This is a pleasant female that presents to clinic 2 week S/P left hallux IPJ fusion. She has kept her dressings CDI and is wearing post op shoe. She denies any pain. Allergies: Cat Hair Extract, Dander, and Scallops   Current Outpatient Medications   Medication Sig Dispense Refill    cephalexin 500 MG Oral Cap Take 1 capsule (500 mg total) by mouth 2 (two) times daily for 7 days. 14 capsule 0    atorvastatin (LIPITOR) 10 MG Oral Tab Take 1 tablet (10 mg total) by mouth nightly. For cholesterol. 90 tablet 1    escitalopram 5 MG Oral Tab Take 1 tablet (5 mg total) by mouth daily with dinner. 90 tablet 3    CLOTRIMAZOLE 1 % External Cream APPLY TO LABIA TWICE DAILY; USE ADDITION 5 DAYS AFTER ALL SYMPTOMS RESOLVED 45 g 0    Ibandronate Sodium 150 MG Oral Tab Take 1 tablet (150 mg total) by mouth every 30 (thirty) days. Help with bone density and osteoporosis. 3 tablet 3    CALCIUM OR Take 500 mg by mouth in the morning, at noon, and at bedtime. estradiol 0.1 MG/GM Vaginal Cream       metRONIDAZOLE 0.75 % External Cream Apply topically 2 (two) times daily. Lubricants (K-Y JELLY) External Gel Apply 1 Application topically.         Past Medical History:   Diagnosis Date    Leg fracture 2007    Low blood pressure       Past Surgical History:   Procedure Laterality Date    COLONOSCOPY  04/2020    COLONOSCOPY N/A 5/8/2020    Procedure: COLONOSCOPY, POSSIBLE BIOPSY, POSSIBLE POLYPECTOMY 40753;  Surgeon: Guillaume Mark MD;  Location: [address] [place] [address] LOCALIZATION WIRE 1 SITE RIGHT ([id number])        Family History   Problem Relation Age of Onset    Colon Cancer Mother     Dementia Mother     Dementia Father     Colon Cancer Maternal Grandmother     Cancer Sister         vaginal Social History    Socioeconomic History      Marital status:     Tobacco Use      Smoking status: Never      Smokeless tobacco: Never    Vaping Use      Vaping Use: Never used    Substance and Sexual Activity      Alcohol use: Yes        Alcohol/week: 0.0 standard drinks of alcohol        Comment: wine, 2-3 glasses daily      Drug use: No    Other Topics      Concerns:        Caffeine Concern: No        Exercise: Yes          5-6 x weekly, classes/walking        History of tanning: No        Pt has a pacemaker: No        Reaction to local anesthetic: No        Left Handed: Yes        Right Handed: No        Currently spends a great deal of time in the sun: No        Hx of Spending [place] of Time in Sun: Yes        Bad sunburns in the past: Yes        Tanning Salons in the Past: No        Hx of Radiation Treatments: No          REVIEW OF SYSTEMS:   Denies nausea, fever, chills  No calf pain  No other muscle or joint aches  Denies chest pain or shortness of breath. EXAM:   There were no vitals taken for this visit. Constitutional:   Patient in no apparent distress. Well kept Of normal body habitus. Alert and oriented to person, place, and time. Integumentary examination:   There are no varicosities. Skin appears moist, warm, and supple with positive hair growth. Left hallux incision sites are well adhered with sutures intact, mild localized erythematous changes concerning for cellulitis. Vascular examination:   DP pulse is 2/4  PT pulse is 2/4  Capillary refill is immediate    Left hallux edema    Neurological examination:   Vibratory (128-Hz tuning fork) sensation is present to right and is present to left. Sharp/dull is present to right and is present to left. Musculoskeletal examination:  Muscle Strength is 5/5.     Left hallux rectus      LABS & IMAGING:     Lab Results   Component Value Date    GLU 86 10/10/2023    BUN 13 10/10/2023    CREATSERUM 0.78 10/10/2023    BUNCREA 16.7 10/10/2023    ANIONGAP 7 10/10/2023    GFRAA 85 07/25/2022    GFRNAA 74 07/25/2022    CA 9.1 10/10/2023     10/10/2023    K 4.2 10/10/2023     10/10/2023    CO2 27.0 10/10/2023    OSMOCALC 297 (H) 10/10/2023        Lab Results   Component Value Date    EAG 91 10/10/2023    A1C 4.8 10/10/2023        No results found. ASSESSMENT AND PLAN:   Diagnoses and all orders for this visit:    S/P foot surgery, left    Post-operative state    Cellulitis of left toe    Other orders  -     cephalexin 500 MG Oral Cap; Take 1 capsule (500 mg total) by mouth 2 (two) times daily for 7 days. Plan:       Evaluated left hallux incision sites which are healing well but did shoe early signs of cellulitis. Prescribed keflex x 7 days    Applied DSD to left foot, pt to keep CDI     Pt to be PWB left foot with post op shoe using heel  Pt to limit ambulation and to elevate left foot when resting    Xray left foot: 10/27/23  CONCLUSION: There is nonspecific bone lucency around the threaded segment of screw within the proximal phalanx, which may represent postprocedural bone remodeling or hardware loosening. Near anatomic alignment of the major osseous structures. Ongoing favorable pattern of bone healing related to fracture within the head of the 1st proximal phalanx. Discussed in great detail with pt and her  the importance of post op compliance. I explained to her that she has a higher chance of post operative complications due to her excessive ambulation the first week after surgery, removing her dressings early and not wearing her post op shoe. She does have a higher chance of delayed union, hardware failure, wound dehiscence and possible infection. RTC 1 week for suture removal and to ensure infection has resolved. Will also get new xrays. No follow-ups on file.     Spring Trimble DPM  11/3/23

## 2023-11-10 ENCOUNTER — OFFICE VISIT (OUTPATIENT)
Dept: PODIATRY CLINIC | Facility: CLINIC | Age: 68
End: 2023-11-10
Payer: MEDICARE

## 2023-11-10 ENCOUNTER — HOSPITAL ENCOUNTER (OUTPATIENT)
Dept: GENERAL RADIOLOGY | Facility: HOSPITAL | Age: 68
Discharge: HOME | End: 2023-11-10
Attending: PODIATRIST
Payer: MEDICARE

## 2023-11-10 DIAGNOSIS — Z98.890 S/P FOOT SURGERY, LEFT: ICD-10-CM

## 2023-11-10 DIAGNOSIS — Z98.890 S/P FOOT SURGERY, LEFT: Primary | ICD-10-CM

## 2023-11-10 DIAGNOSIS — Z98.890 POST-OPERATIVE STATE: ICD-10-CM

## 2023-11-10 PROCEDURE — 99024 POSTOP FOLLOW-UP VISIT: CPT | Performed by: PODIATRIST

## 2023-11-10 PROCEDURE — 73630 X-RAY EXAM OF FOOT: CPT | Performed by: PODIATRIST

## 2023-12-01 ENCOUNTER — OFFICE VISIT (OUTPATIENT)
Dept: PODIATRY CLINIC | Facility: CLINIC | Age: 68
End: 2023-12-01
Payer: MEDICARE

## 2023-12-01 ENCOUNTER — HOSPITAL ENCOUNTER (OUTPATIENT)
Dept: GENERAL RADIOLOGY | Facility: HOSPITAL | Age: 68
Discharge: HOME | End: 2023-12-01
Attending: PODIATRIST
Payer: MEDICARE

## 2023-12-01 DIAGNOSIS — Z98.890 S/P FOOT SURGERY, LEFT: Primary | ICD-10-CM

## 2023-12-01 DIAGNOSIS — Z98.890 S/P FOOT SURGERY, LEFT: ICD-10-CM

## 2023-12-01 PROCEDURE — 99024 POSTOP FOLLOW-UP VISIT: CPT | Performed by: PODIATRIST

## 2023-12-01 PROCEDURE — 73630 X-RAY EXAM OF FOOT: CPT | Performed by: PODIATRIST

## 2023-12-01 NOTE — PROGRESS NOTES
[address] [facility] Podiatry  Progress Note    Faith Jurado is a 70year old female. Chief Complaint   Patient presents with    Post-Op     4th POV L foot-  Pt denies any pain. Pt states foot is healing well. Pt states some swelling in Great toe. Pt would like to know when she can go back to exercising. HPI:     This is a pleasant female that presents to clinic 6 week S/P left hallux IPJ fusion. She denies any pain and has been wearing regular shoes for the past few days. She stopped wearing the post op shoe. Allergies: Cat hair extract, Dander, and Scallops   Current Outpatient Medications   Medication Sig Dispense Refill    escitalopram 5 MG Oral Tab Take 1 tablet (5 mg total) by mouth daily with dinner. 90 tablet 3    CLOTRIMAZOLE 1 % External Cream APPLY TO LABIA TWICE DAILY; USE ADDITION 5 DAYS AFTER ALL SYMPTOMS RESOLVED 45 g 0    CALCIUM OR Take 500 mg by mouth in the morning, at noon, and at bedtime. estradiol 0.1 MG/GM Vaginal Cream       metRONIDAZOLE 0.75 % External Cream Apply topically 2 (two) times daily. Lubricants (K-Y JELLY) External Gel Apply 1 Application topically. atorvastatin (LIPITOR) 10 MG Oral Tab Take 1 tablet (10 mg total) by mouth nightly. For cholesterol. (Patient not taking: Reported on 12/1/2023) 90 tablet 1    Ibandronate Sodium 150 MG Oral Tab Take 1 tablet (150 mg total) by mouth every 30 (thirty) days. Help with bone density and osteoporosis.  3 tablet 3      Past Medical History:   Diagnosis Date    Leg fracture 2007    Low blood pressure       Past Surgical History:   Procedure Laterality Date    COLONOSCOPY  04/2020    COLONOSCOPY N/A 5/8/2020    Procedure: COLONOSCOPY, POSSIBLE BIOPSY, POSSIBLE POLYPECTOMY 97724;  Surgeon: Irish Calix MD;  Location: [address] [place] [address] LOCALIZATION WIRE 1 SITE RIGHT (PSK=[id number])        Family History   Problem Relation Age of Onset    Colon Cancer Mother     Dementia Mother     Dementia Father     Colon Cancer Maternal Grandmother     Cancer Sister         vaginal      Social History     Socioeconomic History    Marital status:    Tobacco Use    Smoking status: Never    Smokeless tobacco: Never   Vaping Use    Vaping Use: Never used   Substance and Sexual Activity    Alcohol use: Yes     Alcohol/week: 0.0 standard drinks of alcohol     Comment: wine, 2-3 glasses daily    Drug use: No   Other Topics Concern    Caffeine Concern No    Exercise Yes     Comment: 5-6 x weekly, classes/walking    History of tanning No    Pt has a pacemaker No    Reaction to local anesthetic No    Left Handed Yes    Right Handed No    Currently spends a great deal of time in the sun No    Hx of Spending [place] of Time in Sun Yes    Bad sunburns in the past Yes    Tanning Salons in the Past No    Hx of Radiation Treatments No           REVIEW OF SYSTEMS:   Denies nausea, fever, chills  No calf pain  No other muscle or joint aches  Denies chest pain or shortness of breath. EXAM:   There were no vitals taken for this visit. Constitutional:   Patient in no apparent distress. Well kept Of normal body habitus. Alert and oriented to person, place, and time. Integumentary examination:   There are no varicosities. Skin appears moist, warm, and supple with positive hair growth. Left hallux incision sites are well healed with no SOI    Vascular examination:   DP pulse is 2/4  PT pulse is 2/4  Capillary refill is immediate    Left hallux edema, improved    Neurological examination:   Vibratory (128-Hz tuning fork) sensation is present to right and is present to left. Sharp/dull is present to right and is present to left. Musculoskeletal examination:  Muscle Strength is 5/5.     Left hallux rectus      LABS & IMAGING:     Lab Results   Component Value Date    GLU 86 10/10/2023    BUN 13 10/10/2023    CREATSERUM 0.78 10/10/2023    BUNCREA 16.7 10/10/2023    ANIONGAP 7 10/10/2023    GFRAA 85 07/25/2022 GFRNAA 74 07/25/2022    CA 9.1 10/10/2023     10/10/2023    K 4.2 10/10/2023     10/10/2023    CO2 27.0 10/10/2023    OSMOCALC 297 (H) 10/10/2023        Lab Results   Component Value Date    EAG 91 10/10/2023    A1C 4.8 10/10/2023        No results found. ASSESSMENT AND PLAN:   Diagnoses and all orders for this visit:    S/P foot surgery, left  -     XR FOOT, COMPLETE (MIN 3 VIEWS), LEFT (CPT=73630); Future                Plan:       Evaluated left hallux incision sites which have healed with no SOI      Xray left foot: 12/1/23  CONCLUSION: There is nonspecific bone lucency around the threaded segment of screw within the proximal phalanx, which may represent postprocedural bone remodeling or hardware loosening. Near anatomic alignment of the major osseous structures. Ongoing favorable pattern of bone healing related to fracture within the head of the 1st proximal phalanx. Discussed in great detail with pt and her  the importance of post op compliance. I explained to her that she has a higher chance of post operative complications due to her excessive ambulation the first week after surgery, removing her dressings early and not wearing her post op shoe. She does have a higher chance of delayed union, hardware failure, wound dehiscence and possible infection. Pt may transition to regular supportive tennis shoes but to avoid any excessive ambulation and running for another 4 weeks. RTC PRN    No follow-ups on file.     Aracelis Menchaca DPM  12/1/23

## 2023-12-07 ENCOUNTER — HOSPITAL ENCOUNTER (INPATIENT)
Facility: HOSPITAL | Age: 68
LOS: 1 days | Discharge: HOME | End: 2023-12-07
Attending: HOSPITALIST | Admitting: HOSPITALIST
Payer: MEDICARE

## 2023-12-07 ENCOUNTER — TELEPHONE (OUTPATIENT)
Dept: SURGERY | Facility: CLINIC | Age: 68
End: 2023-12-07

## 2023-12-07 ENCOUNTER — APPOINTMENT (OUTPATIENT)
Dept: CT IMAGING | Facility: HOSPITAL | Age: 68
End: 2023-12-07
Attending: NURSE PRACTITIONER
Payer: MEDICARE

## 2023-12-07 VITALS
RESPIRATION RATE: 16 BRPM | SYSTOLIC BLOOD PRESSURE: 147 MMHG | WEIGHT: 124.81 LBS | BODY MASS INDEX: 23 KG/M2 | OXYGEN SATURATION: 98 % | TEMPERATURE: 98 F | HEART RATE: 91 BPM | DIASTOLIC BLOOD PRESSURE: 78 MMHG

## 2023-12-07 DIAGNOSIS — N20.1 OBSTRUCTION OF LEFT URETEROPELVIC JUNCTION (UPJ) DUE TO STONE: Primary | ICD-10-CM

## 2023-12-07 DIAGNOSIS — N20.1 OBSTRUCTION OF RIGHT URETEROPELVIC JUNCTION (UPJ) DUE TO STONE: Primary | ICD-10-CM

## 2023-12-07 LAB
ALBUMIN SERPL-MCNC: 4.7 G/DL (ref 3.2–4.8)
ALBUMIN/GLOB SERPL: 1.7 {RATIO} (ref 1–2)
ALP LIVER SERPL-CCNC: 80 U/L
ALT SERPL-CCNC: 15 U/L
ANION GAP SERPL CALC-SCNC: 11 MMOL/L (ref 0–18)
AST SERPL-CCNC: 27 U/L (ref ?–34)
BASOPHILS # BLD AUTO: 0.05 X10(3) UL (ref 0–0.2)
BASOPHILS NFR BLD AUTO: 0.6 %
BILIRUB SERPL-MCNC: 2.2 MG/DL (ref 0.2–1.1)
BILIRUB UR QL: NEGATIVE
BUN BLD-MCNC: 12 MG/DL (ref 9–23)
BUN/CREAT SERPL: 16.2 (ref 10–20)
CALCIUM BLD-MCNC: 9.4 MG/DL (ref 8.7–10.4)
CHLORIDE SERPL-SCNC: 101 MMOL/L (ref 98–112)
CLARITY UR: CLEAR
CO2 SERPL-SCNC: 22 MMOL/L (ref 21–32)
CREAT BLD-MCNC: 0.74 MG/DL
DEPRECATED RDW RBC AUTO: 42.9 FL (ref 35.1–46.3)
EGFRCR SERPLBLD CKD-EPI 2021: 88 ML/MIN/1.73M2 (ref 60–?)
EOSINOPHIL # BLD AUTO: 0.03 X10(3) UL (ref 0–0.7)
EOSINOPHIL NFR BLD AUTO: 0.4 %
ERYTHROCYTE [DISTWIDTH] IN BLOOD BY AUTOMATED COUNT: 12 % (ref 11–15)
GLOBULIN PLAS-MCNC: 2.8 G/DL (ref 2.8–4.4)
GLUCOSE BLD-MCNC: 116 MG/DL (ref 70–99)
GLUCOSE UR-MCNC: NORMAL MG/DL
HCT VFR BLD AUTO: 37.8 %
HGB BLD-MCNC: 12.9 G/DL
IMM GRANULOCYTES # BLD AUTO: 0.04 X10(3) UL (ref 0–1)
IMM GRANULOCYTES NFR BLD: 0.5 %
LEUKOCYTE ESTERASE UR QL STRIP.AUTO: 25
LIPASE SERPL-CCNC: 41 U/L (ref 13–75)
LYMPHOCYTES # BLD AUTO: 0.58 X10(3) UL (ref 1–4)
LYMPHOCYTES NFR BLD AUTO: 7.3 %
MCH RBC QN AUTO: 33.1 PG (ref 26–34)
MCHC RBC AUTO-ENTMCNC: 34.1 G/DL (ref 31–37)
MCV RBC AUTO: 96.9 FL
MONOCYTES # BLD AUTO: 0.33 X10(3) UL (ref 0.1–1)
MONOCYTES NFR BLD AUTO: 4.2 %
NEUTROPHILS # BLD AUTO: 6.91 X10 (3) UL (ref 1.5–7.7)
NEUTROPHILS # BLD AUTO: 6.91 X10(3) UL (ref 1.5–7.7)
NEUTROPHILS NFR BLD AUTO: 87 %
NITRITE UR QL STRIP.AUTO: NEGATIVE
OSMOLALITY SERPL CALC.SUM OF ELEC: 279 MOSM/KG (ref 275–295)
PH UR: 5 [PH] (ref 5–8)
PLATELET # BLD AUTO: 258 10(3)UL (ref 150–450)
POTASSIUM SERPL-SCNC: 3.8 MMOL/L (ref 3.5–5.1)
PROT SERPL-MCNC: 7.5 G/DL (ref 5.7–8.2)
PROT UR-MCNC: 20 MG/DL
RBC # BLD AUTO: 3.9 X10(6)UL
RBC #/AREA URNS AUTO: >10 /HPF
SODIUM SERPL-SCNC: 134 MMOL/L (ref 136–145)
SP GR UR STRIP: 1.01 (ref 1–1.03)
UROBILINOGEN UR STRIP-ACNC: NORMAL
WBC # BLD AUTO: 7.9 X10(3) UL (ref 4–11)

## 2023-12-07 PROCEDURE — 99222 1ST HOSP IP/OBS MODERATE 55: CPT | Performed by: PHYSICIAN ASSISTANT

## 2023-12-07 PROCEDURE — 99235 HOSP IP/OBS SAME DATE MOD 70: CPT | Performed by: INTERNAL MEDICINE

## 2023-12-07 PROCEDURE — 74176 CT ABD & PELVIS W/O CONTRAST: CPT | Performed by: NURSE PRACTITIONER

## 2023-12-07 RX ORDER — ONDANSETRON 4 MG/1
4 TABLET, ORALLY DISINTEGRATING ORAL EVERY 4 HOURS PRN
Status: SHIPPED | COMMUNITY
Start: 2023-12-07

## 2023-12-07 RX ORDER — TAMSULOSIN HYDROCHLORIDE 0.4 MG/1
0.4 CAPSULE ORAL ONCE
Status: COMPLETED | OUTPATIENT
Start: 2023-12-07 | End: 2023-12-07

## 2023-12-07 RX ORDER — HYDROCODONE BITARTRATE AND ACETAMINOPHEN 10; 325 MG/1; MG/1
1 TABLET ORAL EVERY 4 HOURS PRN
Qty: 20 TABLET | Refills: 0 | Status: SHIPPED | OUTPATIENT
Start: 2023-12-07

## 2023-12-07 RX ORDER — HYDROCODONE BITARTRATE AND ACETAMINOPHEN 5; 325 MG/1; MG/1
1-2 TABLET ORAL EVERY 4 HOURS PRN
Status: SHIPPED | COMMUNITY
Start: 2023-12-07

## 2023-12-07 RX ORDER — KETOROLAC TROMETHAMINE 15 MG/ML
15 INJECTION, SOLUTION INTRAMUSCULAR; INTRAVENOUS ONCE
Status: COMPLETED | OUTPATIENT
Start: 2023-12-07 | End: 2023-12-07

## 2023-12-07 RX ORDER — ONDANSETRON 2 MG/ML
4 INJECTION INTRAMUSCULAR; INTRAVENOUS ONCE
Status: COMPLETED | OUTPATIENT
Start: 2023-12-07 | End: 2023-12-07

## 2023-12-07 RX ORDER — KETOROLAC TROMETHAMINE 10 MG/1
10 TABLET, FILM COATED ORAL EVERY 6 HOURS PRN
Qty: 30 TABLET | Refills: 0 | Status: SHIPPED | OUTPATIENT
Start: 2023-12-07

## 2023-12-07 RX ORDER — MORPHINE SULFATE 4 MG/ML
4 INJECTION, SOLUTION INTRAMUSCULAR; INTRAVENOUS ONCE
Status: COMPLETED | OUTPATIENT
Start: 2023-12-07 | End: 2023-12-07

## 2023-12-07 RX ORDER — HYDROCODONE BITARTRATE AND ACETAMINOPHEN 10; 325 MG/1; MG/1
1 TABLET ORAL EVERY 4 HOURS PRN
Status: DISCONTINUED | OUTPATIENT
Start: 2023-12-07 | End: 2023-12-07

## 2023-12-07 NOTE — ED INITIAL ASSESSMENT (HPI)
Patient presents to ED with L flank pain since 2300. Denies dysuria, urgency, and frequency.   +N/V reports taking tylenol at [facility]

## 2023-12-07 NOTE — ED QUICK NOTES
Orders for admission, patient is aware of plan and ready to go upstairs. Any questions, please call ED RN dominique at extension [phone].      Patient Covid vaccination status: Fully vaccinated     COVID Test Ordered in ED: None    COVID Suspicion at Admission: N/A    Running Infusions:      Mental Status/LOC at time of transport: a & o x 4    Other pertinent information:   CIWA score: N/A   NIH score:  N/A

## 2023-12-07 NOTE — DISCHARGE SUMMARY
[facility]    Discharge Summary    Reuben Childs Patient Status:  Inpatient    1955 MRN [id number]   Location [facility] 3W/SW Attending Nash Sanchez MD   Hosp Day # 0 BRENNEN Parker DO     Date of Admission: 2023 Disposition: Home or Self Care     Date of Discharge: 2023    Admitting Diagnosis: Obstruction of left ureteropelvic junction (UPJ) due to stone [N20.1]    Hospital Discharge Diagnoses:  9 mm obstructing UPJ stone on right with mild hydronephrosis. 59-90 High Risk  29-58 Medium Risk  0-28   Low Risk. TCM Follow-Up Recommendation:  LACE < 29: Low Risk of readmission after discharge from the hospital. No TCM follow-up needed.       Problem List:   Patient Active Problem List   Diagnosis    Solar lentigo    Rosacea    Pruritic disorder    Tinea pedis of right foot    Atherosclerosis of both carotid arteries    Closed fracture of nasal bones    Syncope    Ganglion cyst of both wrists    Poor posture    Right wrist pain    Leukopenia    Adjustment disorder with anxious mood    Memory disorder    Dysphagia    Closed fracture of left olecranon process    Postmenopausal    Change in stool    Candidiasis of genitalia in female    Obstruction of left ureteropelvic junction (UPJ) due to stone       Reason for Admission: Pain    Physical Exam:   General appearance: alert, appears stated age and cooperative  Pulmonary:  clear to auscultation bilaterally  Cardiovascular: S1, S2 normal, no murmur, click, rub or gallop, regular rate and rhythm  Abdominal: soft, non-tender; bowel sounds normal; no masses,  no organomegaly  Extremities: extremities normal, atraumatic, no cyanosis or edema  Psychiatric: calm      History of Present Illness: presents with pain in flank found to have UPJ stone    Hospital Course: pain meds given with good relief, urology set up cysto tomorrow    Consultations: urology    Procedures: none    Complications: none    Discharge Condition: Good    Discharge Medications:      Discharge Medications        START taking these medications        Instructions Prescription details   Ketorolac Tromethamine 10 MG Tabs  Commonly known as: TORADOL      Take 1 tablet (10 mg total) by mouth every 6 (six) hours as needed for Pain. Quantity: 30 tablet  Refills: 0            CONTINUE taking these medications        Instructions Prescription details   atorvastatin 10 MG Tabs  Commonly known as: Lipitor      Take 1 tablet (10 mg total) by mouth nightly. For cholesterol. Quantity: 90 tablet  Refills: 1     CALCIUM OR      Take 500 mg by mouth in the morning, at noon, and at bedtime. Refills: 0     escitalopram 5 MG Tabs  Commonly known as: Lexapro      Take 1 tablet (5 mg total) by mouth daily with dinner. Quantity: 90 tablet  Refills: 3     estradiol 0.1 MG/GM Crea  Commonly known as: Estrace       Refills: 0     HYDROcodone-acetaminophen 5-325 MG Tabs  Commonly known as: Norco      Take 1-2 tablets by mouth every 4 (four) hours as needed for Pain. Refills: 0     Ibandronate Sodium 150 MG Tabs  Commonly known as: BONIVA      Take 1 tablet (150 mg total) by mouth every 30 (thirty) days. Help with bone density and osteoporosis. Quantity: 3 tablet  Refills: 3     K-Y Jelly Gel      Apply 1 Application topically. Refills: 0     ondansetron 4 MG Tbdp  Commonly known as: Zofran-ODT      Take 1 tablet (4 mg total) by mouth every 4 (four) hours as needed for Nausea.    Refills: 0            STOP taking these medications      cephalexin 500 MG Caps  Commonly known as: Keflex        clotrimazole 1 % Crea  Commonly known as: Lotrimin        metRONIDAZOLE 0.75 % Crea  Commonly known as: Metrocream                  Where to Get Your Medications        These medications were sent to [facility] [address] [place] [address], [phone], [facility], [address] Hours: 24-hours Phone: [phone]   Ketorolac Tromethamine 10 MG Tabs         Follow up Visits:  Follow-up with tomorrow for cysto  Follow up Labs: none     Other Discharge Instructions: none    Francisco Noonan MD  12/7/2023  12:47 PM    > 35 min

## 2023-12-07 NOTE — PROGRESS NOTES
Discharge instructions reviewed with patient. Spouse present at bedside with patient's consent. Patient and spouse verbalized understanding of discharge instructions, PIV removed, all personal belongings packed and taken with patient. Patient escorted to private vehicle via wheelchair.      Ashton Oskar, VANDANA

## 2023-12-08 ENCOUNTER — HOSPITAL ENCOUNTER (OUTPATIENT)
Facility: HOSPITAL | Age: 68
Setting detail: HOSPITAL OUTPATIENT SURGERY
Discharge: HOME | End: 2023-12-08
Attending: UROLOGY | Admitting: UROLOGY
Payer: MEDICARE

## 2023-12-08 ENCOUNTER — PATIENT OUTREACH (OUTPATIENT)
Dept: CASE MANAGEMENT | Age: 68
End: 2023-12-08

## 2023-12-08 ENCOUNTER — APPOINTMENT (OUTPATIENT)
Dept: GENERAL RADIOLOGY | Facility: HOSPITAL | Age: 68
End: 2023-12-08
Attending: UROLOGY
Payer: MEDICARE

## 2023-12-08 ENCOUNTER — ANESTHESIA (OUTPATIENT)
Dept: SURGERY | Facility: HOSPITAL | Age: 68
End: 2023-12-08
Payer: MEDICARE

## 2023-12-08 ENCOUNTER — TELEPHONE (OUTPATIENT)
Dept: SURGERY | Facility: CLINIC | Age: 68
End: 2023-12-08

## 2023-12-08 ENCOUNTER — ANESTHESIA EVENT (OUTPATIENT)
Dept: SURGERY | Facility: HOSPITAL | Age: 68
End: 2023-12-08
Payer: MEDICARE

## 2023-12-08 VITALS
TEMPERATURE: 98 F | RESPIRATION RATE: 18 BRPM | OXYGEN SATURATION: 95 % | WEIGHT: 124 LBS | HEART RATE: 90 BPM | HEIGHT: 61 IN | SYSTOLIC BLOOD PRESSURE: 124 MMHG | DIASTOLIC BLOOD PRESSURE: 68 MMHG | BODY MASS INDEX: 23.41 KG/M2

## 2023-12-08 PROCEDURE — 52356 CYSTO/URETERO W/LITHOTRIPSY: CPT | Performed by: UROLOGY

## 2023-12-08 PROCEDURE — 0T778DZ DILATION OF LEFT URETER WITH INTRALUMINAL DEVICE, VIA NATURAL OR ARTIFICIAL OPENING ENDOSCOPIC: ICD-10-PCS | Performed by: UROLOGY

## 2023-12-08 PROCEDURE — 0TC78ZZ EXTIRPATION OF MATTER FROM LEFT URETER, VIA NATURAL OR ARTIFICIAL OPENING ENDOSCOPIC: ICD-10-PCS | Performed by: UROLOGY

## 2023-12-08 DEVICE — URETERAL STENT
Type: IMPLANTABLE DEVICE | Site: URETER | Status: FUNCTIONAL
Brand: ASCERTA™

## 2023-12-08 RX ORDER — HYDROMORPHONE HYDROCHLORIDE 1 MG/ML
0.6 INJECTION, SOLUTION INTRAMUSCULAR; INTRAVENOUS; SUBCUTANEOUS EVERY 5 MIN PRN
Status: DISCONTINUED | OUTPATIENT
Start: 2023-12-08 | End: 2023-12-08

## 2023-12-08 RX ORDER — MORPHINE SULFATE 4 MG/ML
4 INJECTION, SOLUTION INTRAMUSCULAR; INTRAVENOUS EVERY 10 MIN PRN
Status: DISCONTINUED | OUTPATIENT
Start: 2023-12-08 | End: 2023-12-08

## 2023-12-08 RX ORDER — ADUCANUMAB 100 MG/ML
INJECTION, SOLUTION INTRAVENOUS
COMMUNITY
Start: 2023-06-21

## 2023-12-08 RX ORDER — ACETAMINOPHEN 500 MG
1000 TABLET ORAL ONCE
Status: COMPLETED | OUTPATIENT
Start: 2023-12-08 | End: 2023-12-08

## 2023-12-08 RX ORDER — NALOXONE HYDROCHLORIDE 0.4 MG/ML
0.08 INJECTION, SOLUTION INTRAMUSCULAR; INTRAVENOUS; SUBCUTANEOUS AS NEEDED
Status: DISCONTINUED | OUTPATIENT
Start: 2023-12-08 | End: 2023-12-08

## 2023-12-08 RX ORDER — HYDROMORPHONE HYDROCHLORIDE 1 MG/ML
0.2 INJECTION, SOLUTION INTRAMUSCULAR; INTRAVENOUS; SUBCUTANEOUS EVERY 5 MIN PRN
Status: DISCONTINUED | OUTPATIENT
Start: 2023-12-08 | End: 2023-12-08

## 2023-12-08 RX ORDER — ONDANSETRON 2 MG/ML
INJECTION INTRAMUSCULAR; INTRAVENOUS AS NEEDED
Status: DISCONTINUED | OUTPATIENT
Start: 2023-12-08 | End: 2023-12-08 | Stop reason: SURG

## 2023-12-08 RX ORDER — DEXAMETHASONE SODIUM PHOSPHATE 4 MG/ML
VIAL (ML) INJECTION AS NEEDED
Status: DISCONTINUED | OUTPATIENT
Start: 2023-12-08 | End: 2023-12-08 | Stop reason: SURG

## 2023-12-08 RX ORDER — CEFADROXIL 500 MG/1
500 CAPSULE ORAL 2 TIMES DAILY
Qty: 6 CAPSULE | Refills: 0 | Status: SHIPPED | OUTPATIENT
Start: 2023-12-09 | End: 2023-12-12

## 2023-12-08 RX ORDER — ROCURONIUM BROMIDE 10 MG/ML
INJECTION, SOLUTION INTRAVENOUS AS NEEDED
Status: DISCONTINUED | OUTPATIENT
Start: 2023-12-08 | End: 2023-12-08 | Stop reason: SURG

## 2023-12-08 RX ORDER — HYDROMORPHONE HYDROCHLORIDE 1 MG/ML
0.4 INJECTION, SOLUTION INTRAMUSCULAR; INTRAVENOUS; SUBCUTANEOUS EVERY 5 MIN PRN
Status: DISCONTINUED | OUTPATIENT
Start: 2023-12-08 | End: 2023-12-08

## 2023-12-08 RX ORDER — SODIUM CHLORIDE, SODIUM LACTATE, POTASSIUM CHLORIDE, CALCIUM CHLORIDE 600; 310; 30; 20 MG/100ML; MG/100ML; MG/100ML; MG/100ML
INJECTION, SOLUTION INTRAVENOUS CONTINUOUS
Status: DISCONTINUED | OUTPATIENT
Start: 2023-12-08 | End: 2023-12-08

## 2023-12-08 RX ORDER — SODIUM CHLORIDE 9 MG/ML
INJECTION, SOLUTION INTRAVENOUS CONTINUOUS
Status: DISCONTINUED | OUTPATIENT
Start: 2023-12-08 | End: 2023-12-08

## 2023-12-08 RX ORDER — MORPHINE SULFATE 4 MG/ML
2 INJECTION, SOLUTION INTRAMUSCULAR; INTRAVENOUS EVERY 10 MIN PRN
Status: DISCONTINUED | OUTPATIENT
Start: 2023-12-08 | End: 2023-12-08

## 2023-12-08 RX ORDER — PHENYLEPHRINE HCL 10 MG/ML
VIAL (ML) INJECTION AS NEEDED
Status: DISCONTINUED | OUTPATIENT
Start: 2023-12-08 | End: 2023-12-08 | Stop reason: SURG

## 2023-12-08 RX ORDER — METOCLOPRAMIDE HYDROCHLORIDE 5 MG/ML
10 INJECTION INTRAMUSCULAR; INTRAVENOUS EVERY 8 HOURS PRN
Status: DISCONTINUED | OUTPATIENT
Start: 2023-12-08 | End: 2023-12-08

## 2023-12-08 RX ORDER — ONDANSETRON 2 MG/ML
4 INJECTION INTRAMUSCULAR; INTRAVENOUS EVERY 6 HOURS PRN
Status: DISCONTINUED | OUTPATIENT
Start: 2023-12-08 | End: 2023-12-08

## 2023-12-08 RX ORDER — PHENAZOPYRIDINE HYDROCHLORIDE 200 MG/1
200 TABLET, FILM COATED ORAL ONCE
Status: CANCELLED | OUTPATIENT
Start: 2023-12-08 | End: 2023-12-08

## 2023-12-08 RX ORDER — MORPHINE SULFATE 10 MG/ML
6 INJECTION, SOLUTION INTRAMUSCULAR; INTRAVENOUS EVERY 10 MIN PRN
Status: DISCONTINUED | OUTPATIENT
Start: 2023-12-08 | End: 2023-12-08

## 2023-12-08 RX ORDER — IOPAMIDOL 612 MG/ML
INJECTION, SOLUTION INTRATHECAL AS NEEDED
Status: DISCONTINUED | OUTPATIENT
Start: 2023-12-08 | End: 2023-12-08 | Stop reason: HOSPADM

## 2023-12-08 RX ADMIN — ROCURONIUM BROMIDE 10 MG: 10 INJECTION, SOLUTION INTRAVENOUS at 15:31:00

## 2023-12-08 RX ADMIN — DEXAMETHASONE SODIUM PHOSPHATE 4 MG: 4 MG/ML VIAL (ML) INJECTION at 15:06:00

## 2023-12-08 RX ADMIN — PHENYLEPHRINE HCL 100 MCG: 10 MG/ML VIAL (ML) INJECTION at 15:06:00

## 2023-12-08 RX ADMIN — ROCURONIUM BROMIDE 20 MG: 10 INJECTION, SOLUTION INTRAVENOUS at 15:01:00

## 2023-12-08 RX ADMIN — ONDANSETRON 4 MG: 2 INJECTION INTRAMUSCULAR; INTRAVENOUS at 15:06:00

## 2023-12-08 NOTE — TELEPHONE ENCOUNTER
Urology staff,  This patient needs a cystoscopy and stent removal on the left in 2 weeks. Please call the patient and arrange. No x-rays are needed before.

## 2023-12-08 NOTE — INTERVAL H&P NOTE
Pre-op Diagnosis: Obstruction of right ureteropelvic junction (UPJ) due to stone [N20.1]    The above referenced H&P was reviewed by Rekha Callahan MD on 12/8/2023, the patient was examined and no significant changes have occurred in the patient's condition since the H&P was performed. I discussed with the patient and/or legal representative the potential benefits, risks and side effects of this procedure; the likelihood of the patient achieving goals; and potential problems that might occur during recuperation. I discussed reasonable alternatives to the procedure, including risks, benefits and side effects related to the alternatives and risks related to not receiving this procedure. We will proceed with procedure as planned.

## 2023-12-08 NOTE — ANESTHESIA POSTPROCEDURE EVALUATION
Patient: Robert Drake    Procedure Summary       Date: 12/08/23 Room / Location: [facility] OR 14 / [facility] OR    Anesthesia Start: 7040 Anesthesia Stop: 0919    Procedures:       CYSTOSCOPY  left URETEROSCOPY,  LASER LITHOTRIPSY, left STENT PLACEMENT (Bladder)      LASER HOLMIUM LITHOTRIPSY (Bladder)      CYSTOSCOPY STENT INSERTION (Ureter) Diagnosis:       Obstruction of right ureteropelvic junction (UPJ) due to stone      (Obstruction of right ureteropelvic junction (UPJ) due to stone [N20.1])    Surgeons: Carisa Umana MD Anesthesiologist: Natasha Jones MD    Anesthesia Type: general ASA Status: 3            Anesthesia Type: general    Vitals Value Taken Time   /92 12/08/23 1549   Temp 98.3F 12/08/23 1551   Pulse 93 12/08/23 1551   Resp 18 12/08/23 1551   SpO2 93 % 12/08/23 1550   Vitals shown include unfiled device data.     [facility] Post Evaluation:   Patient Evaluated in PACU  Patient Participation: complete - patient participated  Level of Consciousness: sleepy but conscious  Pain Score: 0  Pain Management: adequate  Airway Patency:patent  Dental exam unchanged from preop  Yes    Cardiovascular Status: acceptable  Respiratory Status: acceptable  Postoperative Hydration acceptable      Lashay Gudino MD  12/8/2023 3:51 PM

## 2023-12-12 NOTE — TELEPHONE ENCOUNTER
I called pt verified name/ scheduled pt for cysto stent removal with LORRIE on 23 10:30am arrival. Location of [facility] address, yellow parking, 2nd floor given.  I also went over what the pt will expect during the procedure, pt understands appointment booked and call ended

## 2023-12-22 ENCOUNTER — PROCEDURE (OUTPATIENT)
Dept: SURGERY | Facility: CLINIC | Age: 68
End: 2023-12-22
Payer: MEDICARE

## 2023-12-22 VITALS — HEART RATE: 86 BPM | DIASTOLIC BLOOD PRESSURE: 74 MMHG | SYSTOLIC BLOOD PRESSURE: 134 MMHG

## 2023-12-22 DIAGNOSIS — N20.0 KIDNEY STONES: Primary | ICD-10-CM

## 2023-12-22 PROCEDURE — 52310 CYSTOSCOPY AND TREATMENT: CPT | Performed by: UROLOGY

## 2023-12-22 PROCEDURE — 1111F DSCHRG MED/CURRENT MED MERGE: CPT | Performed by: UROLOGY

## 2023-12-22 RX ORDER — CIPROFLOXACIN 500 MG/1
500 TABLET, FILM COATED ORAL ONCE
Status: COMPLETED | OUTPATIENT
Start: 2023-12-22 | End: 2023-12-22

## 2023-12-22 RX ADMIN — CIPROFLOXACIN 500 MG: 500 TABLET, FILM COATED ORAL at 12:10:00

## 2023-12-22 NOTE — PROGRESS NOTES
Gini Vargas is a 70year old female. HPI:     Chief Complaint   Patient presents with    Procedure     Office Cystoscopy/ left stent removal       76-year-old female presents for cystoscopy and left ureteral stent removal.  She is status post cystoscopy, left ureteroscopy laser lithotripsy and stent placement December 8, 2023 for a 9 mm proximal obstructing ureteral stone which was found and dusted appropriately. She reports mild stent related discomfort otherwise feels well. HISTORY:  Past Medical History:   Diagnosis Date    Anxiety state     slight memory loss    Calculus of kidney     High cholesterol     Leg fracture 2007    Low blood pressure       Past Surgical History:   Procedure Laterality Date    COLONOSCOPY  04/2020    COLONOSCOPY N/A 05/08/2020    Procedure: COLONOSCOPY, POSSIBLE BIOPSY, POSSIBLE POLYPECTOMY 41609;  Surgeon: Rajan Culp MD;  Location: [facility], [facility]    FRACTURE SURGERY      Rt wrist pins, left elbow pins, left great toe screw    [facility] LOCALIZATION WIRE 1 SITE RIGHT (CPT=19281)        Family History   Problem Relation Age of Onset    Colon Cancer Mother     Dementia Mother     Dementia Father     Colon Cancer Maternal Grandmother     Cancer Sister         vaginal      Social History:   Social History     Socioeconomic History    Marital status:    Tobacco Use    Smoking status: Never    Smokeless tobacco: Never   Vaping Use    Vaping Use: Never used   Substance and Sexual Activity    Alcohol use:  Yes     Alcohol/week: 0.0 standard drinks of alcohol     Comment: wine, 2-3 glasses daily    Drug use: No   Other Topics Concern    Caffeine Concern No    Exercise Yes     Comment: 5-6 x weekly, classes/walking    History of tanning No    Pt has a pacemaker No    Reaction to local anesthetic No    Left Handed Yes    Right Handed No    Currently spends a great deal of time in the sun No    Hx of Spending [place] of Time in Sun Yes    Bad sunburns in the past Yes    Tanning Salons in the Past No    Hx of Radiation Treatments No   Social History Narrative    The patient does not use an assistive device. .      The patient does live in a home with stairs. Social Determinants of Health     Food Insecurity: No Food Insecurity (12/7/2023)    Food Insecurity     Food Insecurity: Never true   Transportation Needs: No Transportation Needs (12/7/2023)    Transportation Needs     Lack of Transportation: No   Housing Stability: Low Risk  (12/7/2023)    Housing Stability     Housing Instability: No        Medications (Active prior to today's visit):  Current Outpatient Medications   Medication Sig Dispense Refill    Aducanumab-avwa (ADUHELM) 170 MG/1.7ML Intravenous Solution every 30 (thirty) days. Ketorolac Tromethamine 10 MG Oral Tab Take 1 tablet (10 mg total) by mouth every 6 (six) hours as needed for Pain. (Patient taking differently: Take 1 tablet (10 mg total) by mouth every 6 (six) hours as needed for Pain. Not taking) 30 tablet 0    ondansetron 4 MG Oral Tablet Dispersible Take 1 tablet (4 mg total) by mouth every 4 (four) hours as needed for Nausea. HYDROcodone-acetaminophen 5-325 MG Oral Tab Take 1-2 tablets by mouth every 4 (four) hours as needed for Pain. HYDROcodone-acetaminophen  MG Oral Tab Take 1 tablet by mouth every 4 (four) hours as needed. 20 tablet 0    atorvastatin (LIPITOR) 10 MG Oral Tab Take 1 tablet (10 mg total) by mouth nightly. For cholesterol. 90 tablet 1    escitalopram 5 MG Oral Tab Take 1 tablet (5 mg total) by mouth daily with dinner. 90 tablet 3    Ibandronate Sodium 150 MG Oral Tab Take 1 tablet (150 mg total) by mouth every 30 (thirty) days. Help with bone density and osteoporosis. (Patient taking differently: Take 1 tablet (150 mg total) by mouth every 30 (thirty) days. Help with bone density and osteoporosis. Not taking) 3 tablet 3    CALCIUM OR Take 500 mg by mouth in the morning, at noon, and at bedtime. estradiol 0.1 MG/GM Vaginal Cream as needed. Lubricants (K-Y JELLY) External Gel Apply 1 Application topically. Allergies: Allergies   Allergen Reactions    Cat Hair Extract     Dander      Dog    Scallops DIARRHEA     Vomiting         ROS:       PHYSICAL EXAM:   Joanna Garcia  : 1955  Referring Physician: No ref. provider found     Chief Complaint   Patient presents with    Procedure     Office Cystoscopy/ left stent removal       Scribed by:     CYSTOSCOPY    Anesthesia:  2% lidocaine gel    Urethra: Normal    Bladder: Normal.  No tumor, stone, diverticulum, or glomerulation  U.O's: Normal  Trabeculation:   Left ureteral stent removed. POST CYSTOSCOPY MEDICATIONS: sample one tablet Cipro 500mg given to patient    DIAGNOSIS:     PLAN: see below         ASSESSMENT/PLAN:   Assessment   Encounter Diagnosis   Name Primary? Kidney stones Yes       Reviewed findings at length with patient. Recommended follow-up in 6 weeks with a kidney ultrasound 1 to 2 days prior to the visit. She understands plan and agrees. She will call the office if she has any questions or concerns. Orders This Visit:  No orders of the defined types were placed in this encounter.       Meds This Visit:  Requested Prescriptions      No prescriptions requested or ordered in this encounter       Imaging & Referrals:  [facility] (GPI=[id number])     2023  Denilson Levy MD

## 2024-01-30 ENCOUNTER — HOSPITAL ENCOUNTER (OUTPATIENT)
Dept: ULTRASOUND IMAGING | Age: 69
Discharge: HOME | End: 2024-01-30
Attending: UROLOGY
Payer: MEDICARE

## 2024-01-30 DIAGNOSIS — N20.0 KIDNEY STONES: ICD-10-CM

## 2024-01-30 PROCEDURE — 76775 US EXAM ABDO BACK WALL LIM: CPT | Performed by: UROLOGY

## 2024-02-05 ENCOUNTER — OFFICE VISIT (OUTPATIENT)
Dept: SURGERY | Facility: CLINIC | Age: 69
End: 2024-02-05

## 2024-02-05 DIAGNOSIS — N20.0 KIDNEY STONES: Primary | ICD-10-CM

## 2024-02-05 PROCEDURE — 99213 OFFICE O/P EST LOW 20 MIN: CPT | Performed by: UROLOGY

## 2024-02-05 NOTE — PROGRESS NOTES
Bonnie Soria is a 68 year old female.    HPI:     Chief Complaint   Patient presents with    Kidney Problem     Discuss ultrasound,       68-year-old female in follow-up to visit December 22, 2023.  Patient is status post left ureteroscopy laser lithotripsy and stent placement December 8, 2023 for a 9 mm proximal obstructing ureteral stone.  Her stent was removed at her last visit.  She denies any flank pain.  Her only complaint is itching along the left chest wall.  She states she has had it for at least 6 months.  I told the patient likely unrelated to the surgery given the timeline.  She also called her  Сергей as she has had some issues related to memory so we can listen in on the visit today.  I referred the patient after her stent removal    The ultrasound performed January 30, 2024 showing no evidence of hydronephrosis.  There is a 3 mm echogenic focus contralateral to the site of surgery on the right side.  I reviewed her preoperative CT scan and there were no stones identified at the time in the right side.  I reviewed these results with the patient and her .      HISTORY:  Past Medical History:   Diagnosis Date    Anxiety state     slight memory loss    Calculus of kidney     High cholesterol     Leg fracture 2007    Low blood pressure       Past Surgical History:   Procedure Laterality Date    COLONOSCOPY  04/2020    COLONOSCOPY N/A 05/08/2020    Procedure: COLONOSCOPY, POSSIBLE BIOPSY, POSSIBLE POLYPECTOMY 81075;  Surgeon: Phil Marc MD;  [facility] [facility]    FRACTURE SURGERY      Rt wrist pins, left elbow pins, left great toe screw    MARYSE LOCALIZATION WIRE 1 SITE RIGHT (CPT=19281)        Family History   Problem Relation Age of Onset    Colon Cancer Mother     Dementia Mother     Dementia Father     Colon Cancer Maternal Grandmother     Cancer Sister         vaginal      Social History:   Social History     Socioeconomic History    Marital status:     Tobacco Use    Smoking status: Never    Smokeless tobacco: Never   Vaping Use    Vaping Use: Never used   Substance and Sexual Activity    Alcohol use: Yes     Alcohol/week: 0.0 standard drinks of alcohol     Comment: wine, 2-3 glasses daily    Drug use: No   Other Topics Concern    Caffeine Concern No    Exercise Yes     Comment: 5-6 x weekly, classes/walking    History of tanning No    Pt has a pacemaker No    Reaction to local anesthetic No    Left Handed Yes    Right Handed No    Currently spends a great deal of time in the sun No    Hx of Spending Great Deal of Time in Sun Yes    Bad sunburns in the past Yes    Tanning Salons in the Past No    Hx of Radiation Treatments No   Social History Narrative    The patient does not use an assistive device..      The patient does live in a home with stairs.     Social Determinants of Health     Food Insecurity: No Food Insecurity (12/7/2023)    Food Insecurity     Food Insecurity: Never true   Transportation Needs: No Transportation Needs (12/7/2023)    Transportation Needs     Lack of Transportation: No   Housing Stability: Low Risk  (12/7/2023)    Housing Stability     Housing Instability: No        Medications (Active prior to today's visit):  Current Outpatient Medications   Medication Sig Dispense Refill    Elastic Bandages & Supports (POST-OP SHOE/SOFT TOP WOMEN) Does not apply Misc Post op shoe WL 1 each 0    Misc. Devices (WALKER) Does not apply Misc Short walker 1 each 0    Aducanumab-avwa (ADUHELM) 170 MG/1.7ML Intravenous Solution every 30 (thirty) days.      Ketorolac Tromethamine 10 MG Oral Tab Take 1 tablet (10 mg total) by mouth every 6 (six) hours as needed for Pain. (Patient taking differently: Take 1 tablet (10 mg total) by mouth every 6 (six) hours as needed for Pain. Not taking) 30 tablet 0    ondansetron 4 MG Oral Tablet Dispersible Take 1 tablet (4 mg total) by mouth every 4 (four) hours as needed for Nausea.      HYDROcodone-acetaminophen 5-325  MG Oral Tab Take 1-2 tablets by mouth every 4 (four) hours as needed for Pain.      HYDROcodone-acetaminophen  MG Oral Tab Take 1 tablet by mouth every 4 (four) hours as needed. 20 tablet 0    atorvastatin (LIPITOR) 10 MG Oral Tab Take 1 tablet (10 mg total) by mouth nightly. For cholesterol. 90 tablet 1    escitalopram 5 MG Oral Tab Take 1 tablet (5 mg total) by mouth daily with dinner. 90 tablet 3    Ibandronate Sodium 150 MG Oral Tab Take 1 tablet (150 mg total) by mouth every 30 (thirty) days. Help with bone density and osteoporosis. (Patient taking differently: Take 1 tablet (150 mg total) by mouth every 30 (thirty) days. Help with bone density and osteoporosis.  Not taking) 3 tablet 3    CALCIUM OR Take 500 mg by mouth in the morning, at noon, and at bedtime.      estradiol 0.1 MG/GM Vaginal Cream as needed.      Lubricants (K-Y JELLY) External Gel Apply 1 Application topically.         Allergies:  Allergies   Allergen Reactions    Cat Hair Extract     Dander      Dog    Scallops DIARRHEA     Vomiting         ROS:       PHYSICAL EXAM:        ASSESSMENT/PLAN:   Assessment   No diagnosis found.    Recommend:  - Stone friendly diet.  - Discussed possible discontinuation of calcium supplements for history of osteopenia.  I suggested that she discuss this with her primary care physician to weigh the risks versus versus benefits of continued use of calcium supplementation in light of her recent history of kidney stones.  I suggested increasing oral intake of calcium in the diet including ingestion of more yogurt and cheeses if she decides to stop her supplements.  Otherwise, we agreed that she would follow-up on a as needed basis.         Orders This Visit:  No orders of the defined types were placed in this encounter.      Meds This Visit:  Requested Prescriptions      No prescriptions requested or ordered in this encounter       Imaging & Referrals:  None     2/5/2024  Rachid Stephens MD

## 2024-02-17 ENCOUNTER — HOSPITAL ENCOUNTER (OUTPATIENT)
Age: 69
Discharge: HOME | End: 2024-02-17
Payer: MEDICARE

## 2024-02-17 ENCOUNTER — APPOINTMENT (OUTPATIENT)
Dept: GENERAL RADIOLOGY | Age: 69
End: 2024-02-17
Attending: NURSE PRACTITIONER
Payer: MEDICARE

## 2024-02-17 VITALS
HEART RATE: 89 BPM | DIASTOLIC BLOOD PRESSURE: 74 MMHG | OXYGEN SATURATION: 97 % | SYSTOLIC BLOOD PRESSURE: 150 MMHG | RESPIRATION RATE: 18 BRPM | TEMPERATURE: 98 F

## 2024-02-17 DIAGNOSIS — R07.89 RIGHT-SIDED CHEST WALL PAIN: Primary | ICD-10-CM

## 2024-02-17 LAB
#MXD IC: 0.5 X10ˆ3/UL (ref 0.1–1)
ATRIAL RATE: 79 BPM
BUN BLD-MCNC: 12 MG/DL (ref 7–18)
CHLORIDE BLD-SCNC: 104 MMOL/L (ref 98–112)
CO2 BLD-SCNC: 27 MMOL/L (ref 21–32)
CREAT BLD-MCNC: 0.7 MG/DL
DDIMER WHOLE BLOOD: 214 NG/ML DDU (ref ?–400)
EGFRCR SERPLBLD CKD-EPI 2021: 94 ML/MIN/1.73M2 (ref 60–?)
GLUCOSE BLD-MCNC: 90 MG/DL (ref 70–99)
HCT VFR BLD AUTO: 39.1 %
HCT VFR BLD CALC: 42 %
HGB BLD-MCNC: 13.6 G/DL
ISTAT IONIZED CALCIUM FOR CHEM 8: 1.11 MMOL/L (ref 1.12–1.32)
LYMPHOCYTES # BLD AUTO: 0.9 X10ˆ3/UL (ref 1–4)
LYMPHOCYTES NFR BLD AUTO: 16.8 %
MCH RBC QN AUTO: 33.3 PG (ref 26–34)
MCHC RBC AUTO-ENTMCNC: 34.8 G/DL (ref 31–37)
MCV RBC AUTO: 95.6 FL (ref 80–100)
MIXED CELL %: 8.2 %
NEUTROPHILS # BLD AUTO: 4.2 X10ˆ3/UL (ref 1.5–7.7)
NEUTROPHILS NFR BLD AUTO: 75 %
P AXIS: 72 DEGREES
P-R INTERVAL: 146 MS
PLATELET # BLD AUTO: 308 X10ˆ3/UL (ref 150–450)
POTASSIUM BLD-SCNC: 4.9 MMOL/L (ref 3.6–5.1)
Q-T INTERVAL: 396 MS
QRS DURATION: 60 MS
QTC CALCULATION (BEZET): 454 MS
R AXIS: 33 DEGREES
RBC # BLD AUTO: 4.09 X10ˆ6/UL
SODIUM BLD-SCNC: 140 MMOL/L (ref 136–145)
T AXIS: 42 DEGREES
TROPONIN I BLD-MCNC: <0.02 NG/ML
VENTRICULAR RATE: 79 BPM
WBC # BLD AUTO: 5.6 X10ˆ3/UL (ref 4–11)

## 2024-02-17 PROCEDURE — 99215 OFFICE O/P EST HI 40 MIN: CPT

## 2024-02-17 PROCEDURE — 71046 X-RAY EXAM CHEST 2 VIEWS: CPT | Performed by: NURSE PRACTITIONER

## 2024-02-17 PROCEDURE — 36415 COLL VENOUS BLD VENIPUNCTURE: CPT

## 2024-02-17 PROCEDURE — 93010 ELECTROCARDIOGRAM REPORT: CPT

## 2024-02-17 PROCEDURE — 80047 BASIC METABLC PNL IONIZED CA: CPT

## 2024-02-17 PROCEDURE — 84484 ASSAY OF TROPONIN QUANT: CPT

## 2024-02-17 PROCEDURE — 85378 FIBRIN DEGRADE SEMIQUANT: CPT | Performed by: NURSE PRACTITIONER

## 2024-02-17 PROCEDURE — 93005 ELECTROCARDIOGRAM TRACING: CPT

## 2024-02-17 PROCEDURE — 85025 COMPLETE CBC W/AUTO DIFF WBC: CPT | Performed by: NURSE PRACTITIONER

## 2024-02-17 NOTE — ED PROVIDER NOTES
Patient Seen in: [facility]      History     Chief Complaint   Patient presents with    Chest Pain     Stated Complaint: right chest area pain    Subjective:   HPI    This is a well-appearing 68-year-old with a history of hyperlipidemia who presents with right anterior chest wall pain for the last 1 week.  No injury.  Denies any shortness of breath.  Reports that is worse with movement in addition to palpation of the area.  Denies any pain if she is not moving.  No cough or congestion.  No fever or chills.  No cough.  Pain is nonradiating.  Has not attempted any over-the-counter medication.      Objective:   Past Medical History:   Diagnosis Date    Anxiety state     slight memory loss    Calculus of kidney     High cholesterol     Leg fracture 2007    Low blood pressure               Past Surgical History:   Procedure Laterality Date    COLONOSCOPY  04/2020    COLONOSCOPY N/A 05/08/2020    Procedure: COLONOSCOPY, POSSIBLE BIOPSY, POSSIBLE POLYPECTOMY 12643;  Surgeon: Phil Marc MD;  [facility], [facility]    FRACTURE SURGERY      Rt wrist pins, left elbow pins, left great toe screw    MARYSE LOCALIZATION WIRE 1 SITE RIGHT (CPT=19281)                  Social History     Socioeconomic History    Marital status:    Tobacco Use    Smoking status: Never    Smokeless tobacco: Never   Vaping Use    Vaping Use: Never used   Substance and Sexual Activity    Alcohol use: Yes     Alcohol/week: 0.0 standard drinks of alcohol     Comment: wine, 2-3 glasses daily    Drug use: No   Other Topics Concern    Caffeine Concern No    Exercise Yes     Comment: 5-6 x weekly, classes/walking    History of tanning No    Pt has a pacemaker No    Reaction to local anesthetic No    Left Handed Yes    Right Handed No    Currently spends a great deal of time in the sun No    Hx of Spending Great Deal of Time in Sun Yes    Bad sunburns in the past Yes    Tanning Salons in the Past No    Hx of Radiation  Treatments No   Social History Narrative    The patient does not use an assistive device..      The patient does live in a home with stairs.     Social Determinants of Health     Food Insecurity: No Food Insecurity (12/7/2023)    Food Insecurity     Food Insecurity: Never true   Transportation Needs: No Transportation Needs (12/7/2023)    Transportation Needs     Lack of Transportation: No   Housing Stability: Low Risk  (12/7/2023)    Housing Stability     Housing Instability: No              Review of Systems   Cardiovascular:  Positive for chest pain.   All other systems reviewed and are negative.      Positive for stated complaint: right chest area pain  Other systems are as noted in HPI.  Constitutional and vital signs reviewed.      All other systems reviewed and negative except as noted above.    Physical Exam     ED Triage Vitals [02/17/24 0826]   /74   Pulse 89   Resp 18   Temp 97.9 °F (36.6 °C)   Temp src Temporal   SpO2 97 %   O2 Device        Current:/74   Pulse 89   Temp 97.9 °F (36.6 °C) (Temporal)   Resp 18   SpO2 97%         Physical Exam  Vitals and nursing note reviewed.   Constitutional:       General: She is awake. She is not in acute distress.     Appearance: Normal appearance. She is not ill-appearing, toxic-appearing or diaphoretic.   HENT:      Head: Normocephalic and atraumatic.      Right Ear: Tympanic membrane, ear canal and external ear normal.      Left Ear: Tympanic membrane, ear canal and external ear normal.      Nose: Nose normal.      Mouth/Throat:      Lips: Pink.      Mouth: Mucous membranes are moist.      Pharynx: Oropharynx is clear. Uvula midline.   Eyes:      General: Lids are normal.      Extraocular Movements: Extraocular movements intact.      Conjunctiva/sclera: Conjunctivae normal.      Pupils: Pupils are equal, round, and reactive to light.   Cardiovascular:      Rate and Rhythm: Normal rate and regular rhythm.      Pulses: Normal pulses.      Heart  sounds: Normal heart sounds.   Pulmonary:      Effort: Pulmonary effort is normal.      Breath sounds: Normal breath sounds and air entry. No stridor, decreased air movement or transmitted upper airway sounds.   Chest:      Chest wall: Tenderness present. No deformity, swelling, crepitus or edema.          Comments: Right anterior chest wall tender with palpation in addition to movement.  There is no erythema, swelling.  Musculoskeletal:      Cervical back: Full passive range of motion without pain and normal range of motion.   Skin:     General: Skin is warm and dry.      Capillary Refill: Capillary refill takes less than 2 seconds.   Neurological:      General: No focal deficit present.      Mental Status: She is alert and oriented to person, place, and time.   Psychiatric:         Mood and Affect: Mood normal.         Behavior: Behavior normal. Behavior is cooperative.         Thought Content: Thought content normal.         Judgment: Judgment normal.       ED Course     Labs Reviewed   POCT CBC - Abnormal; Notable for the following components:       Result Value    # Lymphocyte 0.9 (*)     All other components within normal limits   POCT ISTAT CHEM8 CARTRIDGE - Abnormal; Notable for the following components:    ISTAT Ionized Calcium 1.11 (*)     All other components within normal limits   D-DIMER (POC) - Normal   ISTAT TROPONIN - Normal       Heart Score:    HEART Score      Title      Criteria Score   Age: 65 and older Age Score: 2   History: Slightly Suspicious Hx Score: 0     EKG: Normal EKG Score: 0   HTN: No   Hypercholesterolemia: Yes   Atherosclerosis/PVD: No     DM: No   BMI>30kg/m2: No   Smoking: No   Family History: No         Other Risk Factor Score: 1               Lab Results   Component Value Date    ITROP <0.02 02/17/2024         HEART Score: 3        Risk of adverse cardiac event is 0.9-1.7%           EKG    Rate, intervals and axes as noted on EKG Report.  Rate: 79  Rhythm: Normal sinus  rhythm  Reading: Normal sinus rhythm, possible left atrial enlargement.  Septal infarct.  No change since previous EKG.    CBC, Chem-8, troponin, D-dimer and reevaluate.    Troponin negative.  D-dimer negative.  Chem-8 and CBC unremarkable.  Chest x-ray ordered.    Chest x-ray reviewed, no acute cardiopulmonary disease.  XR CHEST PA + LAT CHEST (CPT=71046)    Result Date: 2/17/2024  CONCLUSION: No acute cardiopulmonary disease.    Dictated by (CST): Rupert Skinner MD on 2/17/2024 at 9:21 AM     Finalized by (CST): Rupert Skniner MD on 2/17/2024 at 9:22 AM          MDM     Medical Decision Making  Patient is well-appearing on exam, nontoxic in appearance, exam as noted above.  Differential diagnoses including but not limited to ACS, MI, PE, musculoskeletal strain, pleurisy.  Troponin and D-dimer negative.  Chest x-ray was ordered and was reviewed, no acute cardiopulmonary disease.  This is reproducible pain with palpation and movement.  Discussed with patient less likely cardiac in etiology.  Labs here were reassuring.  I did discuss with the patient I would take Motrin in addition to apply ice to the area, close follow-up with the primary care provider was recommended.  I did discuss with her any worsening symptoms she should be seen in the emergency department.  Patient verbalized the plan of care and states understanding.  Case discussed with Dr. Wilson who agrees with plan of care.    Problems Addressed:  Right-sided chest wall pain: acute illness or injury    Amount and/or Complexity of Data Reviewed  External Data Reviewed: ECG.     Details: Previous EKG.  Labs: ordered. Decision-making details documented in ED Course.  Radiology: ordered and independent interpretation performed. Decision-making details documented in ED Course.  ECG/medicine tests: ordered and independent interpretation performed. Decision-making details documented in ED Course.    Risk  OTC drugs.        Disposition and Plan     Clinical  Impression:  1. Right-sided chest wall pain         Disposition:  Discharge  2/17/2024  9:24 am    Follow-up:  Emilio Barajas DO  [address] [place] [address] [address]  [place] [address]  [phone]                Medications Prescribed:  Discharge Medication List as of 2/17/2024  9:25 AM

## 2024-02-17 NOTE — ED INITIAL ASSESSMENT (HPI)
R anterior breast/upper chest wall pain for 1 week, no sob, tender to palpation and worse with movement, no back pain, no trauma or injury

## 2024-05-12 DIAGNOSIS — E78.2 MIXED HYPERLIPIDEMIA: ICD-10-CM

## 2024-05-14 RX ORDER — ATORVASTATIN CALCIUM 10 MG/1
10 TABLET, FILM COATED ORAL NIGHTLY
Qty: 90 TABLET | Refills: 3 | Status: SHIPPED | OUTPATIENT
Start: 2024-05-14

## 2024-05-14 NOTE — TELEPHONE ENCOUNTER
Refill passed per [facility] protocol.  Requested Prescriptions   Pending Prescriptions Disp Refills    ATORVASTATIN 10 MG Oral Tab [Pharmacy Med Name: ATORVASTATIN 10MG TABLETS] 90 tablet 1     Sig: TAKE 1 TABLET(10 MG) BY MOUTH EVERY NIGHT FOR CHOLESTEROL       Cholesterol Medication Protocol Passed - 5/12/2024  2:13 PM        Passed - ALT < 80     Lab Results   Component Value Date    ALT 15 12/07/2023             Passed - ALT resulted within past year        Passed - Lipid panel within past 12 months     Lab Results   Component Value Date    CHOLEST 268 (H) 10/10/2023    TRIG 79 10/10/2023     (H) 10/10/2023     (H) 10/10/2023    VLDL 15 10/10/2023    NONHDLC 163 (H) 10/10/2023             Passed - In person appointment or virtual visit in the past 12 mos or appointment in next 3 mos     Recent Outpatient Visits              3 months ago Kidney stones    [facility], Rachid Coyne MD    Office Visit    5 months ago S/P foot surgery, [facility], Fletcher Ramos, DPM    Office Visit    6 months [facility] Fletcher Floyd, D[facility]    Office Visit    6 months ago S/P foot surgery, [facility], Rustam Mcdaniel, Fletcher Floyd, DPM    Office Visit    6 months ago Abnormal uterine bleeding (AUB)    [facility], Emilio Gray DO    Office Visit                         Recent Outpatient Visits              3 months ago Kidney stones    [facility], Rachid Coyne MD    Office Visit    5 months ago [place] [facility]    Office Visit    6 months ago S/P foot surgery, [facility], Fletcher Ramos  ALFREDO Barrientos    Office Visit    6 months ago S/P foot surgery, left    [facility], [facility], [place] Fletcher Mcdaniel DPM    Office Visit    6 months ago Abnormal uterine bleeding (AUB)    [facility], [address], Emilio Gray DO    Office Visit

## 2024-11-12 ENCOUNTER — APPOINTMENT (OUTPATIENT)
Dept: CT IMAGING | Age: 69
End: 2024-11-12
Attending: PHYSICIAN ASSISTANT
Payer: MEDICARE

## 2024-11-12 ENCOUNTER — HOSPITAL ENCOUNTER (OUTPATIENT)
Age: 69
Discharge: HOME | End: 2024-11-12
Payer: MEDICARE

## 2024-11-12 ENCOUNTER — APPOINTMENT (OUTPATIENT)
Dept: GENERAL RADIOLOGY | Age: 69
End: 2024-11-12
Attending: PHYSICIAN ASSISTANT
Payer: MEDICARE

## 2024-11-12 VITALS
TEMPERATURE: 99 F | HEART RATE: 98 BPM | OXYGEN SATURATION: 96 % | DIASTOLIC BLOOD PRESSURE: 68 MMHG | SYSTOLIC BLOOD PRESSURE: 146 MMHG | RESPIRATION RATE: 16 BRPM

## 2024-11-12 DIAGNOSIS — K57.92 DIVERTICULITIS: Primary | ICD-10-CM

## 2024-11-12 DIAGNOSIS — S16.1XXA NECK STRAIN, INITIAL ENCOUNTER: ICD-10-CM

## 2024-11-12 DIAGNOSIS — N93.8 DYSFUNCTIONAL UTERINE BLEEDING: ICD-10-CM

## 2024-11-12 LAB
#MXD IC: 1.9 X10ˆ3/UL (ref 0.1–1)
BILIRUB UR QL STRIP: NEGATIVE
BUN BLD-MCNC: 13 MG/DL (ref 7–18)
CHLORIDE BLD-SCNC: 98 MMOL/L (ref 98–112)
CLARITY UR: CLEAR
CO2 BLD-SCNC: 24 MMOL/L (ref 21–32)
COLOR UR: YELLOW
CREAT BLD-MCNC: 0.8 MG/DL
EGFRCR SERPLBLD CKD-EPI 2021: 79 ML/MIN/1.73M2 (ref 60–?)
GLUCOSE BLD-MCNC: 112 MG/DL (ref 70–99)
GLUCOSE UR STRIP-MCNC: NEGATIVE MG/DL
HCT VFR BLD AUTO: 39 %
HCT VFR BLD CALC: 41 %
HGB BLD-MCNC: 13.1 G/DL
ISTAT IONIZED CALCIUM FOR CHEM 8: 1.08 MMOL/L (ref 1.12–1.32)
KETONES UR STRIP-MCNC: NEGATIVE MG/DL
LYMPHOCYTES # BLD AUTO: 0.6 X10ˆ3/UL (ref 1–4)
LYMPHOCYTES NFR BLD AUTO: 4.2 %
MCH RBC QN AUTO: 32.8 PG (ref 26–34)
MCHC RBC AUTO-ENTMCNC: 33.6 G/DL (ref 31–37)
MCV RBC AUTO: 97.7 FL (ref 80–100)
MIXED CELL %: 13.4 %
NEUTROPHILS # BLD AUTO: 11.5 X10ˆ3/UL (ref 1.5–7.7)
NEUTROPHILS NFR BLD AUTO: 82.4 %
NITRITE UR QL STRIP: NEGATIVE
PH UR STRIP: 5.5 [PH]
PLATELET # BLD AUTO: 246 X10ˆ3/UL (ref 150–450)
POTASSIUM BLD-SCNC: 4.2 MMOL/L (ref 3.6–5.1)
PROT UR STRIP-MCNC: NEGATIVE MG/DL
RBC # BLD AUTO: 3.99 X10ˆ6/UL
SODIUM BLD-SCNC: 134 MMOL/L (ref 136–145)
SP GR UR STRIP: <=1.005
UROBILINOGEN UR STRIP-ACNC: <2 MG/DL
WBC # BLD AUTO: 14 X10ˆ3/UL (ref 4–11)

## 2024-11-12 PROCEDURE — 99214 OFFICE O/P EST MOD 30 MIN: CPT

## 2024-11-12 PROCEDURE — 80047 BASIC METABLC PNL IONIZED CA: CPT

## 2024-11-12 PROCEDURE — 96361 HYDRATE IV INFUSION ADD-ON: CPT

## 2024-11-12 PROCEDURE — 81002 URINALYSIS NONAUTO W/O SCOPE: CPT

## 2024-11-12 PROCEDURE — 74177 CT ABD & PELVIS W/CONTRAST: CPT | Performed by: PHYSICIAN ASSISTANT

## 2024-11-12 PROCEDURE — 72040 X-RAY EXAM NECK SPINE 2-3 VW: CPT | Performed by: PHYSICIAN ASSISTANT

## 2024-11-12 PROCEDURE — 85025 COMPLETE CBC W/AUTO DIFF WBC: CPT | Performed by: PHYSICIAN ASSISTANT

## 2024-11-12 PROCEDURE — 96374 THER/PROPH/DIAG INJ IV PUSH: CPT

## 2024-11-12 RX ORDER — DIPHENHYDRAMINE HYDROCHLORIDE 50 MG/ML
25 INJECTION INTRAMUSCULAR; INTRAVENOUS ONCE
Status: COMPLETED | OUTPATIENT
Start: 2024-11-12 | End: 2024-11-12

## 2024-11-12 NOTE — ED PROVIDER NOTES
No chief complaint on file.      HPI:     Bonnie Soria is a 69 year old female who presents for evaluation of lower abdominal discomfort and cramping over the last 4 days preceding diarrhea over the last 3 days, denies recent travel illness or antibiotic.  Patient notes colonoscopy every 5 years or family history of colon cancer, denies history of diverticulitis/diverticulosis.  Patient notes 39 normally.  Patient notes intermittent dysfunctional vaginal bleeding over the last few weeks seen by gynecology without further concern or course.  Denies any  procedures to date.  Patient also stating unrelated right posterior neck discomfort over the last 3 weeks worse with movement, seeing chiropractic without significant change, taking ibuprofen Tylenol as needed.  Denies associated fevers chills headache dizziness blurred vision chest pain shortness of breath hematochezia back pain active vaginal bleeding or discharge, upper or lower extremity pain weakness numbness or swelling.  Patient notes previous history of kidney stones with obstruction with different symptoms noted subjectively.      PFSH    PFSH asessment screens reviewed and agree.  Nurses notes reviewed I agree with documentation.    Family History   Problem Relation Age of Onset    Colon Cancer Mother     Dementia Mother     Dementia Father     Colon Cancer Maternal Grandmother     Cancer Sister         vaginal     Family history reviewed with patient/caregiver and is not pertinent to presenting problem.  Social History     Socioeconomic History    Marital status:      Spouse name: Not on file    Number of children: Not on file    Years of education: Not on file    Highest education level: Not on file   Occupational History    Not on file   Tobacco Use    Smoking status: Never    Smokeless tobacco: Never   Vaping Use    Vaping status: Never Used   Substance and Sexual Activity    Alcohol use: Yes     Alcohol/week: 0.0 standard drinks of  alcohol     Comment: wine, 2-3 glasses daily    Drug use: No    Sexual activity: Not on file   Other Topics Concern     Service Not Asked    Blood Transfusions Not Asked    Caffeine Concern No    Occupational Exposure Not Asked    Hobby Hazards Not Asked    Sleep Concern Not Asked    Stress Concern Not Asked    Weight Concern Not Asked    Special Diet Not Asked    Back Care Not Asked    Exercise Yes     Comment: 5-6 x weekly, classes/walking    Bike Helmet Not Asked    Seat Belt Not Asked    Self-Exams Not Asked    Grew up on a farm Not Asked    History of tanning No    Outdoor occupation Not Asked    Pt has a pacemaker No    Pt has a defibrillator Not Asked    Breast feeding Not Asked    Reaction to local anesthetic No    Left Handed Yes    Right Handed No    Currently spends a great deal of time in the sun No    Past Sunlamp Treatments for Acne Not Asked    Hx of Spending Great Deal of Time in Sun Yes    Bad sunburns in the past Yes    Tanning Salons in the Past No    Hx of Radiation Treatments No    Regular use of sun block Not Asked   Social History Narrative    The patient does not use an assistive device..      The patient does live in a home with stairs.     Social Drivers of Health     Financial Resource Strain: Not on file   Food Insecurity: No Food Insecurity (12/7/2023)    Food Insecurity     Food Insecurity: Never true   Transportation Needs: No Transportation Needs (12/7/2023)    Transportation Needs     Lack of Transportation: No   Physical Activity: Not on file   Stress: Not on file   Social Connections: Not on file   Housing Stability: Low Risk  (12/7/2023)    Housing Stability     Housing Instability: No     Housing Instability Emergency: Not on file     Crib or Bassinette: Not on file         ROS:   Positive for stated complaint: Abdominal pain cramping  All other systems reviewed and negative except as noted above.  Constitutional and Vital Signs Reviewed.      Physical Exam:     Findings:     /68   Pulse 98   Temp 99.2 °F (37.3 °C) (Temporal)   Resp 16   SpO2 96%   GENERAL: well developed, well nourished, well hydrated, no distress  SKIN: good skin turgor, no obvious rashes  NECK: Mild right posterior lateral neck tenderness.  No cervical or paracervical tenderness, no nuchal rigidity, no emphysema.  No adenopathy  EXTREMITIES: no cyanosis or edema. VALVERDE without difficulty  GI: No adnexal tenderness or palpable mass bilaterally, no CVA tenderness.  No peritoneal changes or left lower quadrant direct tenderness normal bowel sounds  HEAD: normocephalic, atraumatic  EYES: sclera non icteric bilateral, conjunctiva clear  EARS: TMs clear bilaterally. Canals clear.  NOSE: nasal turbinates: pink, normal mucosa  THROAT: clear, without exudates, uvula midline, and airway patent  LUNGS: clear to auscultation bilaterally; no rales, rhonchi, or wheezes  NEURO: No focal deficits  PSYCH: Alert and oriented x3.  Answering questions appropriately.  Mood appropriate.    MDM/Assessment/Plan:   Orders for this encounter:    Orders Placed This Encounter    CT ABDOMEN+PELVIS(CONTRAST ONLY)(CPT=74177)     Order Specific Question:   If clinically indicated, CT Protocol includes Oral Contrast. Can Oral Contrast be administered?     Answer:   No Oral Contrast (Enter Comment)     Order Specific Question:   What is the Relevant Clinical Indication / Reason for Exam?     Answer:   diarrhea, low abd pain, r/o diverticulitis vs colonic mass     Order Specific Question:   Does patient have poor kidney function or elevated Creatinine/BUN levels?     Answer:   No     Order Specific Question:   Release to patient     Answer:   Immediate    XR CERVICAL SPINE (2-3 VIEWS) (CPT=72040)     Order Specific Question:   What is the Relevant Clinical Indication / Reason for Exam?     Answer:   pain, assess degenerative changes vs compression fx     Order Specific Question:   Release to patient     Answer:   Immediate    POCT CBC      Order Specific Question:   Release to patient     Answer:   Immediate    POCT ISTAT chem8 cartridge    POCT Urinalysis Dipstick    iStat (Chem 8)    POCT Urine Dip    diphenhydrAMINE (Benadryl) 50 mg/mL  injection 25 mg    sodium chloride 0.9 % IV bolus 500 mL    iopamidol 76% (ISOVUE-370) injection for power injector    amoxicillin clavulanate 875-125 MG Oral Tab     Sig: Take 1 tablet by mouth 2 (two) times daily for 7 days.     Dispense:  14 tablet     Refill:  0       Labs performed this visit:  Recent Results (from the past 10 hours)   POCT CBC    Collection Time: 11/12/24 11:11 AM   Result Value Ref Range    WBC IC 14.0 (H) 4.0 - 11.0 x10ˆ3/uL    RBC IC 3.99 3.80 - 5.30 X10ˆ6/uL    HGB IC 13.1 12.0 - 16.0 g/dL    HCT IC 39.0 35.0 - 48.0 %    MCV IC 97.7 80.0 - 100.0 fL    MCH IC 32.8 26.0 - 34.0 pg    MCHC IC 33.6 31.0 - 37.0 g/dL    PLT .0 150.0 - 450.0 X10ˆ3/uL    # Neutrophil 11.5 (H) 1.5 - 7.7 X10ˆ3/uL    # Lymphocyte 0.6 (L) 1.0 - 4.0 X10ˆ3/uL    # Mixed Cells 1.9 (H) 0.1 - 1.0 X10ˆ3/uL    Neutrophil % 82.4 %    Lymphocyte % 4.2 %    Mixed Cell % 13.4 %   POCT ISTAT chem8 cartridge    Collection Time: 11/12/24 11:20 AM   Result Value Ref Range    ISTAT Sodium 134 (L) 136 - 145 mmol/L    ISTAT BUN 13 7 - 18 mg/dL    ISTAT Potassium 4.2 3.6 - 5.1 mmol/L    ISTAT Chloride 98 98 - 112 mmol/L    ISTAT Ionized Calcium 1.08 (L) 1.12 - 1.32 mmol/L    ISTAT Hematocrit 41 34 - 50 %    ISTAT Glucose 112 (H) 70 - 99 mg/dL    ISTAT TCO2 24 21 - 32 mmol/L    ISTAT Creatinine 0.80 0.55 - 1.02 mg/dL    eGFR-Cr 79 >=60 mL/min/1.73m2   POCT Urinalysis Dipstick    Collection Time: 11/12/24 11:52 AM   Result Value Ref Range    Urine Color Yellow Yellow    Urine Clarity Clear Clear    Specific Gravity, Urine <=1.005 1.005 - 1.030    PH, Urine 5.5 5.0 - 8.0    Protein urine Negative Negative mg/dL    Glucose, Urine Negative Negative mg/dL    Ketone, Urine Negative Negative mg/dL    Bilirubin, Urine Negative Negative     Blood, Urine Small (A) Negative    Nitrite Urine Negative Negative    Urobilinogen urine <2.0 <2.0 mg/dL    Leukocyte esterase urine Trace (A) Negative       MDM:  Patient metabolic profile shows leukocytosis, stable hemoglobin, chemistries grossly unremarkable.  Urine without properties concerning of UTI.  Cervical x-ray without acute concerns.  CT abdomen shows uncomplicated diverticulitis.  Patient agrees with oral antibiotics outpatient follow-up with primary for further evaluation of GI concerns.  Instructed on changes warranting emergent reevaluation.  Will follow-up with primary for PT consideration, will address gynecology as needed. Dr Will notified.  Patient instructed to follow-up with GI outpatient, noting next scope is scheduled in March.    Diagnosis:    ICD-10-CM    1. Diverticulitis  K57.92       2. Neck strain, initial encounter  S16.1XXA       3. Dysfunctional uterine bleeding  N93.8           All results reviewed and discussed with patient.  See AVS for detailed discharge instructions for your condition today.    Follow Up with:  Emilio Barajas DO  [address] [place] [id number]  [phone]    Schedule an appointment as soon as possible for a visit in 3 days  FOLLOW UP BY RECOMMENDATION; ADDRESS GYNECOLOGY as NEEDED.

## 2024-11-12 NOTE — ED INITIAL ASSESSMENT (HPI)
Patient reports intermittent cramping, bleeding and spotting as well as loose stools for the last 3 days.  Also with neck pain for the last 3 moths.  Under the care of a chiropractor.  Provider at bedside.

## 2024-11-22 ENCOUNTER — TELEPHONE (OUTPATIENT)
Dept: FAMILY MEDICINE CLINIC | Facility: CLINIC | Age: 69
End: 2024-11-22

## 2024-12-05 ENCOUNTER — OFFICE VISIT (OUTPATIENT)
Dept: FAMILY MEDICINE CLINIC | Facility: CLINIC | Age: 69
End: 2024-12-05
Payer: MEDICARE

## 2024-12-05 VITALS
DIASTOLIC BLOOD PRESSURE: 70 MMHG | TEMPERATURE: 98 F | BODY MASS INDEX: 21.81 KG/M2 | HEIGHT: 61 IN | HEART RATE: 81 BPM | WEIGHT: 115.5 LBS | SYSTOLIC BLOOD PRESSURE: 112 MMHG

## 2024-12-05 DIAGNOSIS — Z00.00 ADULT GENERAL MEDICAL EXAM: Primary | ICD-10-CM

## 2024-12-05 DIAGNOSIS — R41.3 MEMORY DISORDER: ICD-10-CM

## 2024-12-05 DIAGNOSIS — Z87.19 HISTORY OF DIVERTICULITIS: ICD-10-CM

## 2024-12-05 DIAGNOSIS — M80.00XD AGE-RELATED OSTEOPOROSIS WITH CURRENT PATHOLOGICAL FRACTURE WITH ROUTINE HEALING, SUBSEQUENT ENCOUNTER: ICD-10-CM

## 2024-12-05 DIAGNOSIS — Z23 FLU VACCINE NEED: ICD-10-CM

## 2024-12-05 DIAGNOSIS — F43.22 ADJUSTMENT DISORDER WITH ANXIOUS MOOD: ICD-10-CM

## 2024-12-05 DIAGNOSIS — I65.23 ATHEROSCLEROSIS OF BOTH CAROTID ARTERIES: ICD-10-CM

## 2024-12-05 DIAGNOSIS — M54.2 BILATERAL POSTERIOR NECK PAIN: ICD-10-CM

## 2024-12-05 DIAGNOSIS — Z00.00 ENCOUNTER FOR ANNUAL HEALTH EXAMINATION: ICD-10-CM

## 2024-12-05 DIAGNOSIS — E78.2 MIXED HYPERLIPIDEMIA: ICD-10-CM

## 2024-12-05 RX ORDER — ESCITALOPRAM OXALATE 5 MG/1
5 TABLET ORAL
Qty: 90 TABLET | Refills: 3 | Status: SHIPPED | OUTPATIENT
Start: 2024-12-05

## 2024-12-05 NOTE — PROGRESS NOTES
Subjective:   Bonnie Soria is a 69 year old female who presents for a Subsequent Annual Wellness visit (Pt already had Initial Annual Wellness) and scheduled follow up of multiple significant but stable problems.     Last physical done on 10/30/2023.      Pt is here with . She exercises five times a week.    I reviewed labs and medications and discussed with pt. Pt is currently taking Lexapro and it has been providing relief. Pt regularly consults with neurology for Alzheimer's and will be switching to a weekly intravenous medication in January, 2025. She will receive a flu shot today.     Pt wanted to notify of visiting immediate care on 11/12/2024. She was dx with diverticulitis which has since resolved. I reviewed the note and CT abdomen. Pt was placed on antibiotics. Pt due for colonoscopy in 2025 and will call in February to schedule an appointment.      Pt also notes of neck pain of which she saw a Dr at [facility]. She currently is going to physical therapy. I reviewed XR taken at 11/12/2024.  There is no paresthesias down the arms or legs or weakness.  It seems to be more tension related as she states it felt good when I was palpating the trapezius and neck muscles.    History/Other:   Fall Risk Assessment:   She has been screened for Falls and is low risk.      Cognitive Assessment:   Abnormal  What day of the week is this?: Incorrect  What month is it?: Correct  What year is it?: Correct  Recall \"Ball\": Incorrect  Recall \"Flag\": Incorrect  Recall \"Tree\": Incorrect      Functional Ability/Status:   Bonnie Soria has some abnormal functions as listed below:  She has difficulties Taking Meds as Rx'd based on screening of functional status. She has problems with Memory based on screening of functional status.       Depression Screening (PHQ):  PHQ-2 SCORE: 0  , done 11/25/2024             Advanced Directives:   She does have a Living Will but we do NOT have it on file in Epic.    She does have  a POA but we do NOT have it on file in Epic.    Discussed Advance Care Planning with patient (and family/surrogate if present). Standard forms made available to patient in After Visit Summary.      Patient Active Problem List   Diagnosis    Solar lentigo    Rosacea    Pruritic disorder    Tinea pedis of right foot    Atherosclerosis of both carotid arteries    Closed fracture of nasal bones    Syncope    Ganglion cyst of both wrists    Poor posture    Right wrist pain    Leukopenia    Adjustment disorder with anxious mood    Memory disorder    Dysphagia    Closed fracture of left olecranon process    Postmenopausal    Change in stool    Candidiasis of genitalia in female    Obstruction of left ureteropelvic junction (UPJ) due to stone     Allergies:  She is allergic to cat hair extract, dander, and scallops.    Current Medications:  Outpatient Medications Marked as Taking for the 12/5/24 encounter (Office Visit) with Emilio Barajas,    Medication Sig    atorvastatin 10 MG Oral Tab Take 1 tablet (10 mg total) by mouth nightly.    escitalopram 5 MG Oral Tab Take 1 tablet (5 mg total) by mouth daily with dinner.    Lubricants (K-Y JELLY) External Gel Apply 1 Application topically.       Medical History:  She  has a past medical history of Anxiety state, Calculus of kidney, High cholesterol, Leg fracture (2007), and Low blood pressure.  Surgical History:  She  has a past surgical history that includes colonoscopy (04/2020); colonoscopy (N/A, 05/08/2020); meet localization wire 1 site right (cpt=19281); and Fracture surgery.   Family History:  Her family history includes Cancer in her sister; Colon Cancer in her maternal grandmother and mother; Dementia in her father and mother.  Social History:  She  reports that she has never smoked. She has never used smokeless tobacco. She reports current alcohol use. She reports that she does not use drugs.    Tobacco:  She has never smoked tobacco.    CAGE Alcohol Screen:   CAGE  screening score of 0 on 11/25/2024, showing low risk of alcohol abuse.      Patient Care Team:  Emilio Barajas DO as PCP - General (Family Medicine)  Jose Waters MD (NEUROLOGY)  Magen Foster DO (Physical Medicine)  Sterling Galdamez DO as Consulting Physician (NEUROLOGY)    Review of Systems   Respiratory:  Negative for shortness of breath.    Cardiovascular:  Negative for chest pain.   Musculoskeletal:  Positive for neck pain.          Objective:   Physical Exam    Physical Exam   Constitutional: She appears well-developed and well-nourished. No distress.   Head: Normocephalic.   Right Ear: Tympanic membrane and ear canal normal.   Left Ear: Tympanic membrane and ear canal normal.   Nose: No mucosal edema or rhinorrhea.  Mouth/Throat: Oropharynx is clear and moist and mucous membranes are normal.   Eyes: Conjunctivae and EOM are normal. Pupils are equal, round, and reactive to light.   Neck: Normal range of motion. Neck supple. No thyromegaly present.   Cardiovascular: Normal rate, regular rhythm and no murmur heard. 2+ radial and pedal pulses  Pulmonary/Chest: Effort normal and breath sounds normal. No respiratory distress.   Abdominal: Soft. Bowel sounds are normal. There is no hepatosplenomegaly. There is no tenderness.   Lymphadenopathy: She has no cervical adenopathy.   Neurological: She is alert but does have some cognitive deficits due to dementia.  She has normal reflexes. No cranial nerve deficit.   Skin: Skin is warm and dry. No rash noted.   Psychiatric: She has a normal mood and affect.  No agitation.  Very pleasant.  Lower legs: No edema of the legs bilaterally. 2+ pedal pulses bilaterally.      Vitals reviewed.     /73 (BP Location: Left arm, Patient Position: Sitting, Cuff Size: adult)   Pulse 81   Temp 98.3 °F (36.8 °C) (Tympanic)   Ht 5' 1\" (1.549 m)   Wt 115 lb 8 oz   BMI 21.82 kg/m²  Estimated body mass index is 21.82 kg/m² as calculated from the following:    Height as of this  encounter: 5' 1\" (1.549 m).    Weight as of this encounter: 115 lb 8 oz.    Vitals:    12/05/24 1247 12/05/24 1307   BP: 150/73 112/70   BP Location: Left arm    Patient Position: Sitting    Cuff Size: adult    Pulse: 81    Temp: 98.3 °F (36.8 °C)    TempSrc: Tympanic    Weight: 115 lb 8 oz    Height: 5' 1\" (1.549 m)         Medicare Hearing Assessment:   Hearing Screening    Screening Method: Finger Rub  Finger Rub Result: Pass         Visual Acuity:   Right Eye Visual Acuity: Corrected Right Eye Chart Acuity: 20/30   Left Eye Visual Acuity: Corrected Left Eye Chart Acuity: 20/30   Both Eyes Visual Acuity: Corrected Both Eyes Chart Acuity: 20/25   Able To Tolerate Visual Acuity: Yes        Assessment & Plan:   Bonnie Soria is a 69 year old female who presents for a Medicare Assessment.     Diagnoses and all orders for this visit:    Adult general medical exam  Medicare exam done  Memory disorder  Continues to see neurology  Atherosclerosis of both carotid arteries  -     CBC With Differential With Platelet; Future  -     Comp Metabolic Panel (14); Future  -     Lipid Panel; Future  -     Assay, Thyroid Stim Hormone; Future    Mixed hyperlipidemia  -     CBC With Differential With Platelet; Future  -     Comp Metabolic Panel (14); Future  -     Lipid Panel; Future  -     Assay, Thyroid Stim Hormone; Future    Adjustment disorder with anxious mood  -     escitalopram 5 MG Oral Tab; Take 1 tablet (5 mg total) by mouth daily with dinner.  Lexapro works wonderful for her mood  Age-related osteoporosis with current pathological fracture with routine healing, subsequent encounter  -     CBC With Differential With Platelet; Future  -     Comp Metabolic Panel (14); Future  She is on bisphosphonate holiday  Encounter for annual health examination    Flu vaccine need  -     Flu Vaccine, High Dose [85048]    History of diverticulitis  Resolved with antibiotics.  Will see gastroenterology in May of next year  Bilateral  posterior neck pain  Already doing physical therapy for her physician from [facility].      Referrals (if applicable)  No orders of the defined types were placed in this encounter.        Follow up if symptoms persist.  Take medicine (if given) as prescribed.  Approach to treatment discussed and patient/family member understands and agrees to plan.     No follow-ups on file.    The patient indicates understanding of these issues and agrees to the plan.  Reinforced healthy diet, lifestyle, and exercise.      No follow-ups on file.     Roxane Byrd, 12/5/2024   By signing my name below, IRoxane,  attest that this documentation has been prepared under the direction and in the presence of Emilio Barajas DO.   Electronically Signed: Roxane Byrd, 12/5/2024, 1:03 PM.    I, Emilio Barajas DO,  personally performed the services described in this documentation. All medical record entries made by the scribe were at my direction and in my presence.  I have reviewed the chart and discharge instructions (if applicable) and agree that the record reflects my personal performance and is accurate and complete.  Emilio Barajas DO, 12/5/2024, 1:33 PM      Supplementary Documentation:   General Health:  In the past six months, have you lost more than 10 pounds without trying?: (Patient-Rptd) 2 - No  Has your appetite been poor?: (Patient-Rptd) No  Type of Diet: (Patient-Rptd) Balanced  How does the patient maintain a good energy level?: (Patient-Rptd) Appropriate Exercise  How would you describe your daily physical activity?: (Patient-Rptd) Moderate  How would you describe your current health state?: (Patient-Rptd) Good  How do you maintain positive mental well-being?: (Patient-Rptd) Social Interaction;Visiting Friends;Visiting Family  On a scale of 0 to 10, with 0 being no pain and 10 being severe pain, what is your pain level?: (Patient-Rptd) 1 - (Mild)  In the past six months, have you experienced urine leakage?:  (Patient-Rptd) 0-No  At any time do you feel concerned for the safety/well-being of yourself and/or your children, in your home or elsewhere?: (Patient-Rptd) No  Have you had any immunizations at another office such as Influenza, Hepatitis B, Tetanus, or Pneumococcal?: (Patient-Rptd) No    Health Maintenance   Topic Date Due    Annual Depression Screening  01/01/2024    COVID-19 Vaccine (6 - 2024-25 season) 09/01/2024    Influenza Vaccine (1) 10/01/2024    Annual Physical  10/02/2024    Colorectal Cancer Screening  05/08/2025    Mammogram  11/21/2025    DEXA Scan  Completed    Fall Risk Screening (Annual)  Completed    Pneumococcal Vaccine: 65+ Years  Completed    Zoster Vaccines  Completed

## 2024-12-21 ENCOUNTER — LAB ENCOUNTER (OUTPATIENT)
Dept: LAB | Age: 69
End: 2024-12-21
Attending: FAMILY MEDICINE
Payer: MEDICARE

## 2024-12-21 DIAGNOSIS — I65.23 ATHEROSCLEROSIS OF BOTH CAROTID ARTERIES: ICD-10-CM

## 2024-12-21 DIAGNOSIS — M80.00XD AGE-RELATED OSTEOPOROSIS WITH CURRENT PATHOLOGICAL FRACTURE WITH ROUTINE HEALING, SUBSEQUENT ENCOUNTER: ICD-10-CM

## 2024-12-21 DIAGNOSIS — E78.2 MIXED HYPERLIPIDEMIA: ICD-10-CM

## 2024-12-21 LAB
ALBUMIN SERPL-MCNC: 4.6 G/DL (ref 3.2–4.8)
ALBUMIN/GLOB SERPL: 1.8 {RATIO} (ref 1–2)
ALP LIVER SERPL-CCNC: 97 U/L
ALT SERPL-CCNC: 11 U/L
ANION GAP SERPL CALC-SCNC: 7 MMOL/L (ref 0–18)
AST SERPL-CCNC: 21 U/L (ref ?–34)
BASOPHILS # BLD AUTO: 0.05 X10(3) UL (ref 0–0.2)
BASOPHILS NFR BLD AUTO: 1.3 %
BILIRUB SERPL-MCNC: 0.8 MG/DL (ref 0.2–1.1)
BUN BLD-MCNC: 9 MG/DL (ref 9–23)
BUN/CREAT SERPL: 10.6 (ref 10–20)
CALCIUM BLD-MCNC: 9.2 MG/DL (ref 8.7–10.4)
CHLORIDE SERPL-SCNC: 106 MMOL/L (ref 98–112)
CHOLEST SERPL-MCNC: 205 MG/DL (ref ?–200)
CO2 SERPL-SCNC: 28 MMOL/L (ref 21–32)
CREAT BLD-MCNC: 0.85 MG/DL
DEPRECATED RDW RBC AUTO: 43.7 FL (ref 35.1–46.3)
EGFRCR SERPLBLD CKD-EPI 2021: 74 ML/MIN/1.73M2 (ref 60–?)
EOSINOPHIL # BLD AUTO: 0.11 X10(3) UL (ref 0–0.7)
EOSINOPHIL NFR BLD AUTO: 2.9 %
ERYTHROCYTE [DISTWIDTH] IN BLOOD BY AUTOMATED COUNT: 12.3 % (ref 11–15)
FASTING PATIENT LIPID ANSWER: YES
FASTING STATUS PATIENT QL REPORTED: YES
GLOBULIN PLAS-MCNC: 2.5 G/DL (ref 2–3.5)
GLUCOSE BLD-MCNC: 90 MG/DL (ref 70–99)
HCT VFR BLD AUTO: 38.6 %
HDLC SERPL-MCNC: 88 MG/DL (ref 40–59)
HGB BLD-MCNC: 13.3 G/DL
IMM GRANULOCYTES # BLD AUTO: 0.01 X10(3) UL (ref 0–1)
IMM GRANULOCYTES NFR BLD: 0.3 %
LDLC SERPL CALC-MCNC: 100 MG/DL (ref ?–100)
LYMPHOCYTES # BLD AUTO: 1.3 X10(3) UL (ref 1–4)
LYMPHOCYTES NFR BLD AUTO: 33.8 %
MCH RBC QN AUTO: 33.1 PG (ref 26–34)
MCHC RBC AUTO-ENTMCNC: 34.5 G/DL (ref 31–37)
MCV RBC AUTO: 96 FL
MONOCYTES # BLD AUTO: 0.48 X10(3) UL (ref 0.1–1)
MONOCYTES NFR BLD AUTO: 12.5 %
NEUTROPHILS # BLD AUTO: 1.9 X10 (3) UL (ref 1.5–7.7)
NEUTROPHILS # BLD AUTO: 1.9 X10(3) UL (ref 1.5–7.7)
NEUTROPHILS NFR BLD AUTO: 49.2 %
NONHDLC SERPL-MCNC: 117 MG/DL (ref ?–130)
OSMOLALITY SERPL CALC.SUM OF ELEC: 290 MOSM/KG (ref 275–295)
PLATELET # BLD AUTO: 212 10(3)UL (ref 150–450)
POTASSIUM SERPL-SCNC: 4.4 MMOL/L (ref 3.5–5.1)
PROT SERPL-MCNC: 7.1 G/DL (ref 5.7–8.2)
RBC # BLD AUTO: 4.02 X10(6)UL
SODIUM SERPL-SCNC: 141 MMOL/L (ref 136–145)
TRIGL SERPL-MCNC: 100 MG/DL (ref 30–149)
TSI SER-ACNC: 1.3 UIU/ML (ref 0.55–4.78)
VLDLC SERPL CALC-MCNC: 17 MG/DL (ref 0–30)
WBC # BLD AUTO: 3.9 X10(3) UL (ref 4–11)

## 2024-12-21 PROCEDURE — 85025 COMPLETE CBC W/AUTO DIFF WBC: CPT

## 2024-12-21 PROCEDURE — 80061 LIPID PANEL: CPT

## 2024-12-21 PROCEDURE — 84443 ASSAY THYROID STIM HORMONE: CPT

## 2024-12-21 PROCEDURE — 36415 COLL VENOUS BLD VENIPUNCTURE: CPT

## 2024-12-21 PROCEDURE — 80053 COMPREHEN METABOLIC PANEL: CPT

## 2024-12-22 DIAGNOSIS — E78.2 MIXED HYPERLIPIDEMIA: ICD-10-CM

## 2024-12-22 RX ORDER — ATORVASTATIN CALCIUM 10 MG/1
10 TABLET, FILM COATED ORAL NIGHTLY
Qty: 90 TABLET | Refills: 3 | Status: SHIPPED | OUTPATIENT
Start: 2024-12-22

## 2025-01-10 ENCOUNTER — HOSPITAL ENCOUNTER (EMERGENCY)
Facility: HOSPITAL | Age: 70
Discharge: HOME | End: 2025-01-10
Attending: EMERGENCY MEDICINE
Payer: MEDICARE

## 2025-01-10 ENCOUNTER — APPOINTMENT (OUTPATIENT)
Dept: CT IMAGING | Facility: HOSPITAL | Age: 70
End: 2025-01-10
Attending: EMERGENCY MEDICINE
Payer: MEDICARE

## 2025-01-10 ENCOUNTER — APPOINTMENT (OUTPATIENT)
Dept: GENERAL RADIOLOGY | Facility: HOSPITAL | Age: 70
End: 2025-01-10
Attending: EMERGENCY MEDICINE
Payer: MEDICARE

## 2025-01-10 VITALS
BODY MASS INDEX: 22.66 KG/M2 | TEMPERATURE: 98 F | DIASTOLIC BLOOD PRESSURE: 59 MMHG | HEART RATE: 93 BPM | RESPIRATION RATE: 20 BRPM | HEIGHT: 61 IN | OXYGEN SATURATION: 96 % | WEIGHT: 120 LBS | SYSTOLIC BLOOD PRESSURE: 99 MMHG

## 2025-01-10 DIAGNOSIS — S42.225A CLOSED 2-PART NONDISPLACED FRACTURE OF SURGICAL NECK OF LEFT HUMERUS, INITIAL ENCOUNTER: ICD-10-CM

## 2025-01-10 DIAGNOSIS — S42.342A CLOSED DISPLACED SPIRAL FRACTURE OF SHAFT OF LEFT HUMERUS, INITIAL ENCOUNTER: Primary | ICD-10-CM

## 2025-01-10 DIAGNOSIS — S09.90XA INJURY OF HEAD, INITIAL ENCOUNTER: ICD-10-CM

## 2025-01-10 PROCEDURE — 99285 EMERGENCY DEPT VISIT HI MDM: CPT

## 2025-01-10 PROCEDURE — 29105 APPLICATION LONG ARM SPLINT: CPT

## 2025-01-10 PROCEDURE — 70450 CT HEAD/BRAIN W/O DYE: CPT | Performed by: EMERGENCY MEDICINE

## 2025-01-10 PROCEDURE — 96374 THER/PROPH/DIAG INJ IV PUSH: CPT

## 2025-01-10 PROCEDURE — 73060 X-RAY EXAM OF HUMERUS: CPT | Performed by: EMERGENCY MEDICINE

## 2025-01-10 RX ORDER — MORPHINE SULFATE 4 MG/ML
4 INJECTION, SOLUTION INTRAMUSCULAR; INTRAVENOUS ONCE
Status: COMPLETED | OUTPATIENT
Start: 2025-01-10 | End: 2025-01-10

## 2025-01-10 RX ORDER — HYDROCODONE BITARTRATE AND ACETAMINOPHEN 5; 325 MG/1; MG/1
2 TABLET ORAL ONCE
Status: COMPLETED | OUTPATIENT
Start: 2025-01-10 | End: 2025-01-10

## 2025-01-10 RX ORDER — HYDROCODONE BITARTRATE AND ACETAMINOPHEN 5; 325 MG/1; MG/1
1-2 TABLET ORAL EVERY 6 HOURS PRN
Qty: 20 TABLET | Refills: 0 | Status: SHIPPED | OUTPATIENT
Start: 2025-01-10

## 2025-01-10 NOTE — ED NOTES
Patient signed out at shift change.  Results for orders placed or performed in visit on 12/21/24   CBC With Differential With Platelet    Collection Time: 12/21/24  8:32 AM   Result Value Ref Range    WBC 3.9 (L) 4.0 - 11.0 x10(3) uL    RBC 4.02 3.80 - 5.30 x10(6)uL    HGB 13.3 12.0 - 16.0 g/dL    HCT 38.6 35.0 - 48.0 %    MCV 96.0 80.0 - 100.0 fL    MCH 33.1 26.0 - 34.0 pg    MCHC 34.5 31.0 - 37.0 g/dL    RDW-SD 43.7 35.1 - 46.3 fL    RDW 12.3 11.0 - 15.0 %    .0 150.0 - 450.0 10(3)uL    Neutrophil Absolute Prelim 1.90 1.50 - 7.70 x10 (3) uL    Neutrophil Absolute 1.90 1.50 - 7.70 x10(3) uL    Lymphocyte Absolute 1.30 1.00 - 4.00 x10(3) uL    Monocyte Absolute 0.48 0.10 - 1.00 x10(3) uL    Eosinophil Absolute 0.11 0.00 - 0.70 x10(3) uL    Basophil Absolute 0.05 0.00 - 0.20 x10(3) uL    Immature Granulocyte Absolute 0.01 0.00 - 1.00 x10(3) uL    Neutrophil % 49.2 %    Lymphocyte % 33.8 %    Monocyte % 12.5 %    Eosinophil % 2.9 %    Basophil % 1.3 %    Immature Granulocyte % 0.3 %   Comp Metabolic Panel (14)    Collection Time: 12/21/24  8:32 AM   Result Value Ref Range    Glucose 90 70 - 99 mg/dL    Sodium 141 136 - 145 mmol/L    Potassium 4.4 3.5 - 5.1 mmol/L    Chloride 106 98 - 112 mmol/L    CO2 28.0 21.0 - 32.0 mmol/L    Anion Gap 7 0 - 18 mmol/L    BUN 9 9 - 23 mg/dL    Creatinine 0.85 0.55 - 1.02 mg/dL    BUN/CREA Ratio 10.6 10.0 - 20.0    Calcium, Total 9.2 8.7 - 10.4 mg/dL    Calculated Osmolality 290 275 - 295 mOsm/kg    eGFR-Cr 74 >=60 mL/min/1.73m2    ALT 11 10 - 49 U/L    AST 21 <34 U/L    Alkaline Phosphatase 97 55 - 142 U/L    Bilirubin, Total 0.8 0.2 - 1.1 mg/dL    Total Protein 7.1 5.7 - 8.2 g/dL    Albumin 4.6 3.2 - 4.8 g/dL    Globulin  2.5 2.0 - 3.5 g/dL    A/G Ratio 1.8 1.0 - 2.0    Patient Fasting for CMP? Yes    Lipid Panel    Collection Time: 12/21/24  8:32 AM   Result Value Ref Range    Cholesterol, Total 205 (H) <200 mg/dL    HDL Cholesterol 88 (H) 40 - 59 mg/dL    Triglycerides 100  30 - 149 mg/dL    LDL Cholesterol 100 (H) <100 mg/dL    VLDL 17 0 - 30 mg/dL    Non HDL Chol 117 <130 mg/dL    Patient Fasting for Lipid? Yes    Assay, Thyroid Stim Hormone    Collection Time: 12/21/24  8:32 AM   Result Value Ref Range    TSH 1.299 0.550 - 4.780 uIU/mL         CT BRAIN OR HEAD (CPT=70450)    Result Date: 1/10/2025  CONCLUSION:  1. No acute intracranial process by noncontrast CT technique. 2. Intracranial atherosclerosis. 3. Multifocal paranasal sinus mucosal thickening with findings that can be seen in the setting of acute right sphenoid sinusitis. 4. Lesser incidental findings as above.   elm-remote  Dictated by (CST): Tomer Cisneros MD on 1/10/2025 at 7:24 AM     Finalized by (CST): Tomer Cisneros MD on 1/10/2025 at 7:27 AM          XR HUMERUS (MIN 2 VIEWS), LEFT (CPT=73060)    Result Date: 1/10/2025  CONCLUSION:  1.  Acute mildly impacted transverse fracture in the left femoral neck. 2.  Acute oblique/spiral fracture within the midshaft of the left humerus. 3.  Prior left ulna ORIF.   Dictated by (CST): David Ellis MD on 1/10/2025 at 6:56 AM     Finalized by (CST): David Ellis MD on 1/10/2025 at 6:57 AM               Patient signed out at shift change.  I was asked to follow CT brain and to discharge if negative.  Patient status post a fall which previous clinician that was secondary to drowsiness/EtOH.  Sustained 2 fractures to left upper extremity status post coaptation splint.      On my exam, patient has equal radial pulses, coaptation splint in place with equal radial pulses.  Moving fingers.  Denies headache, only complaining of left arm pain.  Neurologically and vascularly intact otherwise.  Family at bedside states that patient is acting at her baseline.  She has no cardiac history.  No chest pain, shortness of breath.  Denies any other areas of pain.  Thankfully CT brain negative.  Previous physician prescribed Norco.  Have encouraged hydration and stool softener.  Patient encouraged  to follow with orthopedics as an outpatient.  Soft compartments of bilateral upper extremities-compartment syndrome considered and unlikely.  No other areas of trauma        Traumatic CVA, cervical spine fracture, spine fracture, traumatic cardiac injury (i.e. pneumothorax, cardiac contusion, etc), organ abdominal injury (bowel perforation, organ perforation), among other life-threatening medical conditions considered and seems unlikely given patient's history, exam, and appearance.  Patient is non toxic appearing, is in no distress, hemodynamically stable.  Pt agrees and is aware of plan.         .

## 2025-01-10 NOTE — ED PROVIDER NOTES
Patient Seen in: [facility] Emergency Department    History     Chief Complaint   Patient presents with    Fall       HPI    Patient presents to the ED with  after falling last night around 3 AM.  She states that she does not remember why she fell although  states she had 4 to 5 glasses of wine and thinks this could have been the cause.  Patient injured her left arm in the fall and complains of pain in the left upper arm.  Also hit her head but denies a headache and is not on anticoagulants.    History reviewed.   Past Medical History:    Anxiety state    slight memory loss    Calculus of kidney    High cholesterol    Leg fracture    Low blood pressure       History reviewed.   Past Surgical History:   Procedure Laterality Date    Colonoscopy  04/2020    Colonoscopy N/A 05/08/2020    Procedure: COLONOSCOPY, POSSIBLE BIOPSY, POSSIBLE POLYPECTOMY 71751;  Surgeon: Phil Marc MD;  Location: [facility], [facility]    Fracture surgery      Rt wrist pins, left elbow pins, left great toe screw    Juan localization wire 1 site right (cpt=19281)           Medications :  Prescriptions Prior to Admission[1]     Family History   Problem Relation Age of Onset    Colon Cancer Mother     Dementia Mother     Dementia Father     Colon Cancer Maternal Grandmother     Cancer Sister         vaginal       Smoking Status:   Social History     Socioeconomic History    Marital status:    Tobacco Use    Smoking status: Never    Smokeless tobacco: Never   Vaping Use    Vaping status: Never Used   Substance and Sexual Activity    Alcohol use: Yes     Alcohol/week: 0.0 standard drinks of alcohol     Comment: wine, 2-3 glasses daily    Drug use: No   Other Topics Concern    Caffeine Concern No    Exercise Yes     Comment: 5-6 x weekly, classes/walking    History of tanning No    Pt has a pacemaker No    Reaction to local anesthetic No    Left Handed Yes    Right Handed No    Currently spends a great deal of  time in the sun No    Hx of Spending Great Deal of Time in Sun Yes    Bad sunburns in the past Yes    Tanning Salons in the Past No    Hx of Radiation Treatments No       Constitutional and vital signs reviewed.      Social History and Family History elements reviewed from today, pertinent positives to the presenting problem noted.    Physical Exam     ED Triage Vitals [01/10/25 0451]   /72   Pulse 91   Resp 20   Temp 97.9 °F (36.6 °C)   Temp src Oral   SpO2 96 %   O2 Device None (Room air)       All measures to prevent infection transmission during my interaction with the patient were taken. Handwashing was performed prior to and after the exam.  Stethoscope and any equipment used during my examination was cleaned with super sani-cloth germicidal wipes following the exam.     Physical Exam  Vitals and nursing note reviewed.   Constitutional:       General: She is not in acute distress.     Appearance: She is well-developed.   HENT:      Head: Normocephalic.      Comments: Small contusion to the right eyebrow area  Eyes:      General:         Right eye: No discharge.         Left eye: No discharge.      Conjunctiva/sclera: Conjunctivae normal.   Neck:      Trachea: No tracheal deviation.   Cardiovascular:      Rate and Rhythm: Normal rate.   Pulmonary:      Effort: Pulmonary effort is normal. No respiratory distress.      Breath sounds: No stridor.   Abdominal:      General: There is no distension.      Palpations: Abdomen is soft.   Musculoskeletal:         General: Swelling and tenderness present. No deformity.      Comments: Significant tenderness and swelling to the left upper arm.  Patient unable to move her arm at the shoulder.  Normal left radial pulse.  Normal strength and sensation in the left hand.   Skin:     General: Skin is warm and dry.   Neurological:      Mental Status: She is alert and oriented to person, place, and time.   Psychiatric:         Mood and Affect: Mood normal.         Behavior:  Behavior normal.         ED Course      Labs Reviewed - No data to display    As Interpreted by me    Imaging Results Available and Reviewed while in ED: XR HUMERUS (MIN 2 VIEWS), LEFT (CPT=73060)    Result Date: 1/10/2025  CONCLUSION:  1.  Acute mildly impacted transverse fracture in the left femoral neck. 2.  Acute oblique/spiral fracture within the midshaft of the left humerus. 3.  Prior left ulna ORIF.   Dictated by (CST): David Ellis MD on 1/10/2025 at 6:56 AM     Finalized by (CST): David Ellis MD on 1/10/2025 at 6:57 AM         ED Medications Administered:   Medications   HYDROcodone-acetaminophen (Norco) 5-325 MG per tab 2 tablet (2 tablets Oral Given 1/10/25 0657)         MDM     Vitals:    01/10/25 0451 01/10/25 0700   BP: 113/72 134/76   Pulse: 91 108   Resp: 20    Temp: 97.9 °F (36.6 °C)    TempSrc: Oral    SpO2: 96% 96%   Weight: 54.4 kg    Height: 154.9 cm (5' 1\")      *I personally reviewed and interpreted all ED vitals.    Pulse Ox: 96%, Room air, Normal     Monitor Interpretation:   normal sinus rhythm as interpreted by me.  The cardiac monitor was ordered to monitor heart rate.    Differential Diagnosis/ Diagnostic Considerations: Humerus fracture, head injury, ICH, other    Complicating Factors: The patient already has does not have any pertinent problems on file. to contribute to the complexity of this ED evaluation.    Medical Decision Making  Patient presents to the ED after falling and injuring her left arm last night.  X-ray of the humerus shows a midshaft spiral fracture as well as a surgical neck fracture of the humerus.  Neurovascular intact in the left arm.  Patient was placed in a coaptation splint.  CT brain without intracranial injury.  Stable for discharge with orthopedic follow-up.    Procedure:  A left long-arm splint was applied by an ED tech.  After application of the splint I returned and re-examined the patient.  The splint was adequately immobilizing the injured extremity,  and distal to the splint the patient's circulation and sensation was intact.      Problems Addressed:  Closed 2-part nondisplaced fracture of surgical neck of left humerus, initial encounter: acute illness or injury  Closed displaced spiral fracture of shaft of left humerus, initial encounter: acute illness or injury  Injury of head, initial encounter: acute illness or injury that poses a threat to life or bodily functions    Amount and/or Complexity of Data Reviewed  Radiology: ordered and independent interpretation performed. Decision-making details documented in ED Course.     Details: I personally reviewed the patient's left humerus fracture noted a spiral fracture of the midshaft humerus    Risk  Prescription drug management.        Condition upon leaving the department: Stable    Disposition and Plan     Clinical Impression:  1. Closed displaced spiral fracture of shaft of left humerus, initial encounter    2. Closed 2-part nondisplaced fracture of surgical neck of left humerus, initial encounter    3. Injury of head, initial encounter        Disposition:  There is no disposition on file for this visit.    Follow-up:  Zafar Ernst MD  [facility] [address] [facility] [address]  [phone]    Schedule an appointment as soon as possible for a visit in 1 day(s)        Medications Prescribed:  Current Discharge Medication List        START taking these medications    Details   !! HYDROcodone-acetaminophen 5-325 MG Oral Tab Take 1-2 tablets by mouth every 6 (six) hours as needed for Pain.  Qty: 20 tablet, Refills: 0    Associated Diagnoses: Closed 2-part nondisplaced fracture of surgical neck of left humerus, initial encounter       !! - Potential duplicate medications found. Please discuss with provider.                           [1] (Not in a hospital admission)

## 2025-01-14 ENCOUNTER — MED REC SCAN ONLY (OUTPATIENT)
Dept: FAMILY MEDICINE CLINIC | Facility: CLINIC | Age: 70
End: 2025-01-14

## 2025-04-07 ENCOUNTER — APPOINTMENT (OUTPATIENT)
Dept: GENERAL RADIOLOGY | Age: 70
End: 2025-04-07
Attending: NURSE PRACTITIONER
Payer: MEDICARE

## 2025-04-07 ENCOUNTER — HOSPITAL ENCOUNTER (OUTPATIENT)
Age: 70
Discharge: HOME | End: 2025-04-07
Payer: MEDICARE

## 2025-04-07 VITALS
TEMPERATURE: 100 F | DIASTOLIC BLOOD PRESSURE: 73 MMHG | OXYGEN SATURATION: 96 % | RESPIRATION RATE: 16 BRPM | HEART RATE: 88 BPM | SYSTOLIC BLOOD PRESSURE: 152 MMHG

## 2025-04-07 DIAGNOSIS — J45.909 REACTIVE AIRWAY DISEASE WITHOUT COMPLICATION, UNSPECIFIED ASTHMA SEVERITY, UNSPECIFIED WHETHER PERSISTENT (HCC): Primary | ICD-10-CM

## 2025-04-07 PROCEDURE — 99214 OFFICE O/P EST MOD 30 MIN: CPT

## 2025-04-07 PROCEDURE — 71046 X-RAY EXAM CHEST 2 VIEWS: CPT | Performed by: NURSE PRACTITIONER

## 2025-04-07 PROCEDURE — 99213 OFFICE O/P EST LOW 20 MIN: CPT

## 2025-04-07 RX ORDER — PREDNISONE 20 MG/1
40 TABLET ORAL DAILY
Qty: 10 TABLET | Refills: 0 | Status: SHIPPED | OUTPATIENT
Start: 2025-04-07 | End: 2025-04-12

## 2025-04-07 NOTE — ED INITIAL ASSESSMENT (HPI)
Dry Cough x1.5 weeks, worse at night. Patient states she coughs to the point of feeling like she will vomit. Denies fevers or chills. Attempted robitussin and nyquil with no relief

## 2025-04-07 NOTE — ED PROVIDER NOTES
Patient Seen in: [facility]      History     Chief Complaint   Patient presents with    Cough     Stated Complaint: cough    Subjective:   HPI    6-year-old female presents with cough.  Patient states she has had a persistent cough for 1 week.  She is reports coughing spasms.  She denies chest pain.  She denies shortness of breath.  She denies fever.      Objective:     Past Medical History:    Anxiety state    slight memory loss    Calculus of kidney    High cholesterol    Leg fracture    Low blood pressure              Past Surgical History:   Procedure Laterality Date    Colonoscopy  04/2020    Colonoscopy N/A 05/08/2020    Procedure: COLONOSCOPY, POSSIBLE BIOPSY, POSSIBLE POLYPECTOMY 37587;  Surgeon: Phil Marc MD;  Location: [facility], [facility]    Fracture surgery      Rt wrist pins, left elbow pins, left great toe screw    Juan localization wire 1 site right (cpt=19281)                  Social History     Socioeconomic History    Marital status:    Tobacco Use    Smoking status: Never    Smokeless tobacco: Never   Vaping Use    Vaping status: Never Used   Substance and Sexual Activity    Alcohol use: Yes     Alcohol/week: 0.0 standard drinks of alcohol     Comment: wine, 2-3 glasses daily    Drug use: No   Other Topics Concern    Caffeine Concern No    Exercise Yes     Comment: 5-6 x weekly, classes/walking    History of tanning No    Pt has a pacemaker No    Reaction to local anesthetic No    Left Handed Yes    Right Handed No    Currently spends a great deal of time in the sun No    Hx of Spending Great Deal of Time in Sun Yes    Bad sunburns in the past Yes    Tanning Salons in the Past No    Hx of Radiation Treatments No   Social History Narrative    The patient does not use an assistive device..      The patient does live in a home with stairs.     Social Drivers of Health     Food Insecurity: No Food Insecurity (12/7/2023)    Food Insecurity     Food Insecurity: Never true    Transportation Needs: No Transportation Needs (12/7/2023)    Transportation Needs     Lack of Transportation: No   Housing Stability: Low Risk  (12/7/2023)    Housing Stability     Housing Instability: No              Review of Systems    Positive for stated complaint: cough  Other systems are as noted in HPI.  Constitutional and vital signs reviewed.      All other systems reviewed and negative except as noted above.    Physical Exam     ED Triage Vitals [04/07/25 1702]   /73   Pulse 88   Resp 16   Temp 99.5 °F (37.5 °C)   Temp src Oral   SpO2 96 %   O2 Device None (Room air)       Current Vitals:   Vital Signs  BP: 152/73  Pulse: 88  Resp: 16  Temp: 99.5 °F (37.5 °C)  Temp src: Oral    Oxygen Therapy  SpO2: 96 %  O2 Device: None (Room air)        Physical Exam  Vitals reviewed.   Constitutional:       General: She is not in acute distress.  HENT:      Nose: Nose normal.      Mouth/Throat:      Mouth: Mucous membranes are moist.   Cardiovascular:      Rate and Rhythm: Normal rate and regular rhythm.   Pulmonary:      Effort: Pulmonary effort is normal. No respiratory distress.      Breath sounds: Normal breath sounds. No wheezing or rhonchi.   Musculoskeletal:         General: Normal range of motion.      Cervical back: Normal range of motion and neck supple.   Skin:     General: Skin is warm and dry.   Neurological:      Mental Status: She is alert and oriented to person, place, and time.   Psychiatric:         Mood and Affect: Mood normal.         Behavior: Behavior normal.             ED Course   Labs Reviewed - No data to display       XR CHEST PA + LAT CHEST (CPT=71046)          PROCEDURE: XR CHEST PA + LAT CHEST (CPT=71046)     COMPARISON: [facility], XR CHEST PA + LAT CHEST (CPT=71046), 2/17/2024, 9:06 AM.     INDICATIONS: Dry cough x 2.5 weeks.     TECHNIQUE:   Two views.       FINDINGS:   CARDIAC/VASC: Normal.  No cardiac silhouette abnormality or cardiomegaly.   Unremarkable pulmonary vasculature.    MEDIAST/MICHELLE: No visible mass or adenopathy.   LUNGS/PLEURA: Normal.  No significant pulmonary parenchymal abnormalities.  No effusion or pleural thickening.    BONES: No fracture or visible bony lesion.   OTHER: Negative.                =====  CONCLUSION: No acute cardiopulmonary process.           Dictated by (CST): Deven Christina MD on 4/07/2025 at 5:31 PM       Finalized by (CST): Deven Christina MD on 4/07/2025 at 5:31 PM                            [facility]              Medical Decision Making  69-year-old female presents with cough.  Differential diagnosis includes viral upper respiratory infection, pneumonia, postnasal drip induced cough, reactive airway.  Patient is unremarkable exam.  She is in no acute distress.  Chest x-ray was done and shows no pneumonia.  Patient will be treated for reactive airway disease.  Prescription for prednisone was sent to her pharmacy.  She declined printed instructions but will give was given verbal instructions for help with her symptoms.    Amount and/or Complexity of Data Reviewed  Radiology: ordered.     Details: CXR images reviewed by IC provider.  Shows neg PNA    Risk  OTC drugs.  Prescription drug management.        Disposition and Plan     Clinical Impression:  1. Reactive airway disease without complication, unspecified asthma severity, unspecified whether persistent (HCC)         Disposition:  Discharge  4/7/2025  5:47 pm    Follow-up:  Emilio Barajas DO  [address] [place] [address]  [phone]                Medications Prescribed:  Discharge Medication List as of 4/7/2025  5:47 PM              Supplementary Documentation:

## (undated) DIAGNOSIS — S52.022A CLOSED FRACTURE OF OLECRANON PROCESS OF LEFT ULNA, INITIAL ENCOUNTER: Primary | ICD-10-CM

## (undated) DEVICE — ISOVUE-300. 61% INFUS BTL

## (undated) DEVICE — CYSTO PACK: Brand: MEDLINE INDUSTRIES, INC.

## (undated) DEVICE — OR TOWEL, 17" X 26" STERILE, BLUE: Brand: PREMIERPRO

## (undated) DEVICE — SLEEVE COMPR M KNEE LEN SGL USE KENDALL SCD

## (undated) DEVICE — SOLUTION IRRIG 3000ML 0.9% NACL FLX CONT

## (undated) DEVICE — SINGLE ACTION PUMPING SYSTEM

## (undated) DEVICE — GAMMEX® PI HYBRID SIZE 8, STERILE POWDER-FREE SURGICAL GLOVE, POLYISOPRENE AND NEOPRENE BLEND: Brand: GAMMEX

## (undated) DEVICE — DILATOR/SHEATH SET: Brand: 8/10 DILATOR/SHEATH SET

## (undated) DEVICE — PAD,EYE,LARGE,2 1/8"X2 5/8",STERILE,LF: Brand: MEDLINE

## (undated) DEVICE — UROLOGY DRAIN BAG

## (undated) DEVICE — SOLUTION IRRIG 1000ML ST H2O AQUALITE PLAS

## (undated) DEVICE — SNAP KOVER: Brand: UNBRANDED

## (undated) DEVICE — SERVICE RENTAL LSR TECH ONLY

## (undated) DEVICE — ENDOSCOPIC VALVE WITH ADAPTER.: Brand: SURSEAL® II

## (undated) DEVICE — TIGERTAIL 5F FLXTIP 70CM

## (undated) DEVICE — SOLUTION IRRIG 1000ML 0.9% NACL USP BTL

## (undated) DEVICE — FIBER LSR 200UM 2J 80HZ 60W DL FOR LITHO

## (undated) DEVICE — NITINOL WIRE WITH HYDROPHILIC TIP: Brand: SENSOR

## (undated) DEVICE — GUIDEWIRE ENDOSCP L150CM DIA0.035IN TIP 3CM

## (undated) NOTE — MR AVS SNAPSHOT
[facility] SPECIALTY [facility] - [place] [address] [address] [id number] [phone]               Thank you for choosing us for your health care visit with Emily Hammonds MD.  We are glad to serve you and happy to provide you with this summary of y information, go to [email or web address] and click on the Sign Up Now link in the [employer] box. Enter your [employer] Activation Code exactly as it appears below along with your Zip Code and Date of Birth to complete the sign-up process.  If you do

## (undated) NOTE — MR AVS SNAPSHOT
[facility] SPECIALTY [facility] - [address]  [address] [phone]               Thank you for choosing us for your health care visit with Kristi Gonzalez DO.   We are glad to serve you and happy to provide you with this summary of not sign up before the expiration date, you must request a new code. Your unique [employer] Access Code: [email or web address]  Expires: 6/24/2017  5:37 PM    If you have questions, you can call [phone] to talk to our [facility] Staff.  Remember, [employer]

## (undated) NOTE — LETTER
20      Patient: Monika Allison  : 1955 Visit date: 2020    Dear Angelina Aguilar,      I examined your patient in consultation today.     She has an asymptomatic ganglion of the left wrist.  She has some pain when she does push-ups,

## (undated) NOTE — LETTER
Lorrie Robins Do  [address]   [address]  [address] [facility], [address] [facility]       12/12/18        Patient: Gisele Arora   YOB: 1955   Date of Visit: 12/11/2018       Dear  Dr. Katia Crump DO,      Thank you for referring Gisele Arora to my pract

## (undated) NOTE — LETTER
[address]  [address] [facility]  [phone]        Dear Efrain Munoz, ,      I had the pleasure of seeing your patient, Jakob Choi on 3/7/2019. Below please find a summary of our visit.   If y and tries to get out of bed very carefully. Current Outpatient Medications:  Multiple Vitamins-Minerals (MULTI-VITAMIN/MINERALS) Oral Tab Take 1 tablet by mouth daily. Disp:  Rfl:    Resveratrol 100 MG Oral Cap Take by mouth.  Disp:  Rfl:    Omega Cuff Size: adult)   Pulse 84   Ht 61.5\"   Wt 116 lb   BMI 21.56 kg/m²     CV: No  Evidence of Carotid Bruits, Regular Rate and Rhythm  GI: + Bowel sounds, soft. Resp: Clear, no Wheezes  Extremities: NO edema, no erythema.   Neuro:  Higher Integrative Fun Omega-3 Fatty Acids (FISH OIL OR) Take by mouth. Disp:  Rfl:    Probiotic Product (PROBIOTIC DAILY OR) Take by mouth. Disp:  Rfl:        No Follow-up on file.     Thelma Coronado MD, MD